# Patient Record
Sex: FEMALE | Race: OTHER | HISPANIC OR LATINO | URBAN - METROPOLITAN AREA
[De-identification: names, ages, dates, MRNs, and addresses within clinical notes are randomized per-mention and may not be internally consistent; named-entity substitution may affect disease eponyms.]

---

## 2017-04-26 ENCOUNTER — OUTPATIENT (OUTPATIENT)
Dept: OUTPATIENT SERVICES | Facility: HOSPITAL | Age: 77
LOS: 1 days | Discharge: HOME | End: 2017-04-26

## 2017-06-25 PROBLEM — Z00.00 ENCOUNTER FOR PREVENTIVE HEALTH EXAMINATION: Status: ACTIVE | Noted: 2017-06-25

## 2017-06-28 DIAGNOSIS — I20.9 ANGINA PECTORIS, UNSPECIFIED: ICD-10-CM

## 2017-07-24 ENCOUNTER — APPOINTMENT (OUTPATIENT)
Dept: OPHTHALMOLOGY | Facility: CLINIC | Age: 77
End: 2017-07-24

## 2018-01-22 ENCOUNTER — APPOINTMENT (OUTPATIENT)
Dept: OPHTHALMOLOGY | Facility: CLINIC | Age: 78
End: 2018-01-22
Payer: MEDICARE

## 2018-01-22 PROCEDURE — 92014 COMPRE OPH EXAM EST PT 1/>: CPT

## 2018-03-23 ENCOUNTER — OUTPATIENT (OUTPATIENT)
Dept: OUTPATIENT SERVICES | Facility: HOSPITAL | Age: 78
LOS: 1 days | Discharge: HOME | End: 2018-03-23

## 2018-03-23 VITALS — WEIGHT: 109.79 LBS | HEIGHT: 60 IN

## 2018-03-23 DIAGNOSIS — Z01.818 ENCOUNTER FOR OTHER PREPROCEDURAL EXAMINATION: ICD-10-CM

## 2018-03-23 DIAGNOSIS — Z98.890 OTHER SPECIFIED POSTPROCEDURAL STATES: Chronic | ICD-10-CM

## 2018-03-23 DIAGNOSIS — D68.8 OTHER SPECIFIED COAGULATION DEFECTS: ICD-10-CM

## 2018-03-23 DIAGNOSIS — K80.20 CALCULUS OF GALLBLADDER WITHOUT CHOLECYSTITIS WITHOUT OBSTRUCTION: ICD-10-CM

## 2018-03-23 LAB
ALBUMIN SERPL ELPH-MCNC: 4.4 G/DL — SIGNIFICANT CHANGE UP (ref 3.5–5.2)
ALP SERPL-CCNC: 88 U/L — SIGNIFICANT CHANGE UP (ref 30–115)
ALT FLD-CCNC: 16 U/L — SIGNIFICANT CHANGE UP (ref 0–41)
ANION GAP SERPL CALC-SCNC: 13 MMOL/L — SIGNIFICANT CHANGE UP (ref 7–14)
APTT BLD: 28.9 SEC — SIGNIFICANT CHANGE UP (ref 27–39.2)
AST SERPL-CCNC: 24 U/L — SIGNIFICANT CHANGE UP (ref 0–41)
BASOPHILS # BLD AUTO: 0.05 K/UL — SIGNIFICANT CHANGE UP (ref 0–0.2)
BASOPHILS NFR BLD AUTO: 1 % — SIGNIFICANT CHANGE UP (ref 0–1)
BILIRUB DIRECT SERPL-MCNC: <0.2 MG/DL — SIGNIFICANT CHANGE UP (ref 0–0.2)
BILIRUB INDIRECT FLD-MCNC: >0.3 MG/DL — SIGNIFICANT CHANGE UP (ref 0.2–1.2)
BILIRUB SERPL-MCNC: 0.5 MG/DL — SIGNIFICANT CHANGE UP (ref 0.2–1.2)
BUN SERPL-MCNC: 11 MG/DL — SIGNIFICANT CHANGE UP (ref 10–20)
CALCIUM SERPL-MCNC: 9.4 MG/DL — SIGNIFICANT CHANGE UP (ref 8.5–10.1)
CHLORIDE SERPL-SCNC: 101 MMOL/L — SIGNIFICANT CHANGE UP (ref 98–110)
CO2 SERPL-SCNC: 28 MMOL/L — SIGNIFICANT CHANGE UP (ref 17–32)
CREAT SERPL-MCNC: 0.7 MG/DL — SIGNIFICANT CHANGE UP (ref 0.7–1.5)
EOSINOPHIL # BLD AUTO: 0.33 K/UL — SIGNIFICANT CHANGE UP (ref 0–0.7)
EOSINOPHIL NFR BLD AUTO: 6.7 % — SIGNIFICANT CHANGE UP (ref 0–8)
GLUCOSE SERPL-MCNC: 89 MG/DL — SIGNIFICANT CHANGE UP (ref 70–110)
HCT VFR BLD CALC: 39.3 % — SIGNIFICANT CHANGE UP (ref 37–47)
HGB BLD-MCNC: 12.6 G/DL — SIGNIFICANT CHANGE UP (ref 12–16)
IMM GRANULOCYTES NFR BLD AUTO: 0.2 % — SIGNIFICANT CHANGE UP (ref 0.1–0.3)
INR BLD: 1.01 RATIO — SIGNIFICANT CHANGE UP (ref 0.65–1.3)
LYMPHOCYTES # BLD AUTO: 1.73 K/UL — SIGNIFICANT CHANGE UP (ref 1.2–3.4)
LYMPHOCYTES # BLD AUTO: 34.9 % — SIGNIFICANT CHANGE UP (ref 20.5–51.1)
MCHC RBC-ENTMCNC: 29.7 PG — SIGNIFICANT CHANGE UP (ref 27–31)
MCHC RBC-ENTMCNC: 32.1 G/DL — SIGNIFICANT CHANGE UP (ref 32–37)
MCV RBC AUTO: 92.7 FL — SIGNIFICANT CHANGE UP (ref 81–99)
MONOCYTES # BLD AUTO: 0.46 K/UL — SIGNIFICANT CHANGE UP (ref 0.1–0.6)
MONOCYTES NFR BLD AUTO: 9.3 % — SIGNIFICANT CHANGE UP (ref 1.7–9.3)
NEUTROPHILS # BLD AUTO: 2.37 K/UL — SIGNIFICANT CHANGE UP (ref 1.4–6.5)
NEUTROPHILS NFR BLD AUTO: 47.9 % — SIGNIFICANT CHANGE UP (ref 42.2–75.2)
NRBC # BLD: 0 /100 WBCS — SIGNIFICANT CHANGE UP (ref 0–0)
PLATELET # BLD AUTO: 176 K/UL — SIGNIFICANT CHANGE UP (ref 130–400)
POTASSIUM SERPL-MCNC: 4.8 MMOL/L — SIGNIFICANT CHANGE UP (ref 3.5–5)
POTASSIUM SERPL-SCNC: 4.8 MMOL/L — SIGNIFICANT CHANGE UP (ref 3.5–5)
PROT SERPL-MCNC: 7.1 G/DL — SIGNIFICANT CHANGE UP (ref 6–8)
PROTHROM AB SERPL-ACNC: 10.9 SEC — SIGNIFICANT CHANGE UP (ref 9.95–12.87)
RBC # BLD: 4.24 M/UL — SIGNIFICANT CHANGE UP (ref 4.2–5.4)
RBC # FLD: 13 % — SIGNIFICANT CHANGE UP (ref 11.5–14.5)
SODIUM SERPL-SCNC: 142 MMOL/L — SIGNIFICANT CHANGE UP (ref 135–146)
WBC # BLD: 4.95 K/UL — SIGNIFICANT CHANGE UP (ref 4.8–10.8)
WBC # FLD AUTO: 4.95 K/UL — SIGNIFICANT CHANGE UP (ref 4.8–10.8)

## 2018-03-23 NOTE — H&P PST ADULT - PMH
Anxiety    GERD (gastroesophageal reflux disease)    Hypercholesteremia    Hypothyroidism    OA (osteoarthritis)

## 2018-03-23 NOTE — H&P PST ADULT - FAMILY HISTORY
Father  Still living? No  CVA (cerebral vascular accident), Age at diagnosis: Age Unknown     Mother  Still living? Unknown  Family history of stomach cancer, Age at diagnosis: Age Unknown

## 2018-03-23 NOTE — H&P PST ADULT - REASON FOR ADMISSION
77 Y/O FEMALE HERE FOR PRE-ADMISSION SURGICAL TESTING. PATIENT REPORTS SHE WAS HAVING ABDOMINAL PAIN ON & OFF X1 YR. SONOGRAM REVEALED + GALLSTONES.  NOW FOR SCHEDULED LAPAROSCOPIC CHOLECYSTECTOMY, POSSIBLE OPEN.

## 2018-03-23 NOTE — H&P PST ADULT - HISTORY OF PRESENT ILLNESS
PATIENT DENIES CHEST PAIN, SHORTNESS OF BREATH, PALPITATIONS, COUGHING, FEVER, DYSURIA.  CAN WALK UP 2-3 FLIGHTS OF STEPS WITHOUT SOB.

## 2018-03-24 LAB
T4 AB SER-ACNC: 6.8 UG/DL — SIGNIFICANT CHANGE UP (ref 4.6–12)
TSH SERPL-MCNC: 5.82 UIU/ML — HIGH (ref 0.27–4.2)

## 2018-03-28 ENCOUNTER — OUTPATIENT (OUTPATIENT)
Dept: OUTPATIENT SERVICES | Facility: HOSPITAL | Age: 78
LOS: 1 days | Discharge: HOME | End: 2018-03-28

## 2018-03-28 DIAGNOSIS — Z98.890 OTHER SPECIFIED POSTPROCEDURAL STATES: Chronic | ICD-10-CM

## 2018-03-28 DIAGNOSIS — R31.9 HEMATURIA, UNSPECIFIED: ICD-10-CM

## 2018-04-06 ENCOUNTER — OUTPATIENT (OUTPATIENT)
Dept: OUTPATIENT SERVICES | Facility: HOSPITAL | Age: 78
LOS: 1 days | Discharge: HOME | End: 2018-04-06

## 2018-04-06 ENCOUNTER — RESULT REVIEW (OUTPATIENT)
Age: 78
End: 2018-04-06

## 2018-04-06 VITALS
RESPIRATION RATE: 13 BRPM | DIASTOLIC BLOOD PRESSURE: 85 MMHG | HEART RATE: 73 BPM | OXYGEN SATURATION: 96 % | SYSTOLIC BLOOD PRESSURE: 167 MMHG

## 2018-04-06 VITALS
DIASTOLIC BLOOD PRESSURE: 65 MMHG | SYSTOLIC BLOOD PRESSURE: 153 MMHG | TEMPERATURE: 98 F | HEIGHT: 60 IN | OXYGEN SATURATION: 99 % | WEIGHT: 109.79 LBS | HEART RATE: 60 BPM | RESPIRATION RATE: 16 BRPM

## 2018-04-06 DIAGNOSIS — Z98.890 OTHER SPECIFIED POSTPROCEDURAL STATES: Chronic | ICD-10-CM

## 2018-04-06 RX ORDER — METOPROLOL TARTRATE 50 MG
1 TABLET ORAL
Qty: 0 | Refills: 0 | COMMUNITY

## 2018-04-06 RX ORDER — SODIUM CHLORIDE 9 MG/ML
1000 INJECTION, SOLUTION INTRAVENOUS
Qty: 0 | Refills: 0 | Status: DISCONTINUED | OUTPATIENT
Start: 2018-04-06 | End: 2018-04-21

## 2018-04-06 RX ORDER — ROSUVASTATIN CALCIUM 5 MG/1
1 TABLET ORAL
Qty: 0 | Refills: 0 | COMMUNITY

## 2018-04-06 RX ORDER — MORPHINE SULFATE 50 MG/1
2 CAPSULE, EXTENDED RELEASE ORAL
Qty: 0 | Refills: 0 | Status: DISCONTINUED | OUTPATIENT
Start: 2018-04-06 | End: 2018-04-06

## 2018-04-06 RX ORDER — MORPHINE SULFATE 50 MG/1
1 CAPSULE, EXTENDED RELEASE ORAL
Qty: 0 | Refills: 0 | Status: DISCONTINUED | OUTPATIENT
Start: 2018-04-06 | End: 2018-04-06

## 2018-04-06 RX ORDER — ONDANSETRON 8 MG/1
4 TABLET, FILM COATED ORAL ONCE
Qty: 0 | Refills: 0 | Status: COMPLETED | OUTPATIENT
Start: 2018-04-06 | End: 2018-04-06

## 2018-04-06 RX ORDER — LEVOTHYROXINE SODIUM 125 MCG
1 TABLET ORAL
Qty: 0 | Refills: 0 | COMMUNITY

## 2018-04-06 RX ORDER — CHOLECALCIFEROL (VITAMIN D3) 125 MCG
1 CAPSULE ORAL
Qty: 0 | Refills: 0 | COMMUNITY

## 2018-04-06 RX ORDER — ASPIRIN/CALCIUM CARB/MAGNESIUM 324 MG
1 TABLET ORAL
Qty: 0 | Refills: 0 | COMMUNITY

## 2018-04-06 RX ORDER — ACETAMINOPHEN WITH CODEINE 300MG-30MG
1 TABLET ORAL
Qty: 20 | Refills: 0 | OUTPATIENT
Start: 2018-04-06 | End: 2018-04-10

## 2018-04-06 RX ORDER — OXYCODONE AND ACETAMINOPHEN 5; 325 MG/1; MG/1
1 TABLET ORAL EVERY 4 HOURS
Qty: 0 | Refills: 0 | Status: DISCONTINUED | OUTPATIENT
Start: 2018-04-06 | End: 2018-04-06

## 2018-04-06 RX ADMIN — MORPHINE SULFATE 2 MILLIGRAM(S): 50 CAPSULE, EXTENDED RELEASE ORAL at 12:50

## 2018-04-06 RX ADMIN — MORPHINE SULFATE 2 MILLIGRAM(S): 50 CAPSULE, EXTENDED RELEASE ORAL at 12:47

## 2018-04-06 RX ADMIN — MORPHINE SULFATE 2 MILLIGRAM(S): 50 CAPSULE, EXTENDED RELEASE ORAL at 13:12

## 2018-04-06 RX ADMIN — SODIUM CHLORIDE 60 MILLILITER(S): 9 INJECTION, SOLUTION INTRAVENOUS at 14:46

## 2018-04-06 RX ADMIN — ONDANSETRON 4 MILLIGRAM(S): 8 TABLET, FILM COATED ORAL at 13:59

## 2018-04-06 NOTE — BRIEF OPERATIVE NOTE - POST-OP DX
Calculus of gallbladder with chronic cholecystitis without obstruction  04/06/2018    Active  Aly Sharif

## 2018-04-06 NOTE — BRIEF OPERATIVE NOTE - PROCEDURE
<<-----Click on this checkbox to enter Procedure Laparoscopic cholecystectomy  04/06/2018    Active  LUISANA

## 2018-04-06 NOTE — BRIEF OPERATIVE NOTE - PRE-OP DX
Calculus of gallbladder with chronic cholecystitis without obstruction  04/06/2018    Active  Aly Sharif  Cholecystitis  04/06/2018    Active  Aly Sharif

## 2018-04-12 DIAGNOSIS — K80.10 CALCULUS OF GALLBLADDER WITH CHRONIC CHOLECYSTITIS WITHOUT OBSTRUCTION: ICD-10-CM

## 2018-04-12 DIAGNOSIS — Z91.040 LATEX ALLERGY STATUS: ICD-10-CM

## 2018-04-12 LAB — SURGICAL PATHOLOGY STUDY: SIGNIFICANT CHANGE UP

## 2018-07-23 ENCOUNTER — APPOINTMENT (OUTPATIENT)
Dept: OPHTHALMOLOGY | Facility: CLINIC | Age: 78
End: 2018-07-23
Payer: MEDICARE

## 2018-07-23 PROBLEM — K21.9 GASTRO-ESOPHAGEAL REFLUX DISEASE WITHOUT ESOPHAGITIS: Chronic | Status: ACTIVE | Noted: 2018-03-23

## 2018-07-23 PROBLEM — M19.90 UNSPECIFIED OSTEOARTHRITIS, UNSPECIFIED SITE: Chronic | Status: ACTIVE | Noted: 2018-03-23

## 2018-07-23 PROBLEM — E78.00 PURE HYPERCHOLESTEROLEMIA, UNSPECIFIED: Chronic | Status: ACTIVE | Noted: 2018-03-23

## 2018-07-23 PROBLEM — E03.9 HYPOTHYROIDISM, UNSPECIFIED: Chronic | Status: ACTIVE | Noted: 2018-03-23

## 2018-07-23 PROBLEM — F41.9 ANXIETY DISORDER, UNSPECIFIED: Chronic | Status: ACTIVE | Noted: 2018-03-23

## 2018-07-23 PROCEDURE — 92012 INTRM OPH EXAM EST PATIENT: CPT

## 2018-10-25 ENCOUNTER — OUTPATIENT (OUTPATIENT)
Dept: OUTPATIENT SERVICES | Facility: HOSPITAL | Age: 78
LOS: 1 days | Discharge: HOME | End: 2018-10-25

## 2018-10-25 DIAGNOSIS — R55 SYNCOPE AND COLLAPSE: ICD-10-CM

## 2018-10-25 DIAGNOSIS — Z98.890 OTHER SPECIFIED POSTPROCEDURAL STATES: Chronic | ICD-10-CM

## 2019-01-24 ENCOUNTER — APPOINTMENT (OUTPATIENT)
Dept: OPHTHALMOLOGY | Facility: CLINIC | Age: 79
End: 2019-01-24
Payer: MEDICARE

## 2019-01-24 DIAGNOSIS — H20.023 RECURRENT ACUTE IRIDOCYCLITIS, BILATERAL: ICD-10-CM

## 2019-01-24 DIAGNOSIS — H26.9 UNSPECIFIED CATARACT: ICD-10-CM

## 2019-01-24 DIAGNOSIS — H02.403 UNSPECIFIED PTOSIS OF BILATERAL EYELIDS: ICD-10-CM

## 2019-01-24 DIAGNOSIS — H35.9 UNSPECIFIED RETINAL DISORDER: ICD-10-CM

## 2019-01-24 PROCEDURE — 92012 INTRM OPH EXAM EST PATIENT: CPT

## 2019-04-30 ENCOUNTER — APPOINTMENT (OUTPATIENT)
Dept: CARDIOLOGY | Facility: CLINIC | Age: 79
End: 2019-04-30
Payer: MEDICARE

## 2019-04-30 PROCEDURE — 93000 ELECTROCARDIOGRAM COMPLETE: CPT

## 2019-04-30 PROCEDURE — 99214 OFFICE O/P EST MOD 30 MIN: CPT

## 2019-05-01 ENCOUNTER — APPOINTMENT (OUTPATIENT)
Dept: NEUROLOGY | Facility: CLINIC | Age: 79
End: 2019-05-01

## 2019-05-10 ENCOUNTER — APPOINTMENT (OUTPATIENT)
Dept: NEUROLOGY | Facility: CLINIC | Age: 79
End: 2019-05-10
Payer: MEDICARE

## 2019-05-10 PROCEDURE — 99213 OFFICE O/P EST LOW 20 MIN: CPT

## 2019-07-25 ENCOUNTER — NON-APPOINTMENT (OUTPATIENT)
Age: 79
End: 2019-07-25

## 2019-07-25 ENCOUNTER — APPOINTMENT (OUTPATIENT)
Dept: OPHTHALMOLOGY | Facility: CLINIC | Age: 79
End: 2019-07-25
Payer: MEDICARE

## 2019-07-25 PROCEDURE — 92014 COMPRE OPH EXAM EST PT 1/>: CPT

## 2019-07-25 PROCEDURE — 76519 ECHO EXAM OF EYE: CPT

## 2019-08-26 ENCOUNTER — APPOINTMENT (OUTPATIENT)
Dept: OTOLARYNGOLOGY | Facility: CLINIC | Age: 79
End: 2019-08-26
Payer: MEDICARE

## 2019-08-26 ENCOUNTER — APPOINTMENT (OUTPATIENT)
Dept: NEUROLOGY | Facility: CLINIC | Age: 79
End: 2019-08-26

## 2019-08-26 VITALS
SYSTOLIC BLOOD PRESSURE: 147 MMHG | DIASTOLIC BLOOD PRESSURE: 77 MMHG | BODY MASS INDEX: 21.6 KG/M2 | WEIGHT: 110 LBS | HEART RATE: 58 BPM | HEIGHT: 60 IN | TEMPERATURE: 97.7 F

## 2019-08-26 DIAGNOSIS — H92.09 OTALGIA, UNSPECIFIED EAR: ICD-10-CM

## 2019-08-26 DIAGNOSIS — E07.9 DISORDER OF THYROID, UNSPECIFIED: ICD-10-CM

## 2019-08-26 DIAGNOSIS — Z80.9 FAMILY HISTORY OF MALIGNANT NEOPLASM, UNSPECIFIED: ICD-10-CM

## 2019-08-26 DIAGNOSIS — Z80.6 FAMILY HISTORY OF LEUKEMIA: ICD-10-CM

## 2019-08-26 DIAGNOSIS — Z86.39 PERSONAL HISTORY OF OTHER ENDOCRINE, NUTRITIONAL AND METABOLIC DISEASE: ICD-10-CM

## 2019-08-26 DIAGNOSIS — Z78.9 OTHER SPECIFIED HEALTH STATUS: ICD-10-CM

## 2019-08-26 DIAGNOSIS — R51 HEADACHE: ICD-10-CM

## 2019-08-26 DIAGNOSIS — Z87.39 PERSONAL HISTORY OF OTHER DISEASES OF THE MUSCULOSKELETAL SYSTEM AND CONNECTIVE TISSUE: ICD-10-CM

## 2019-08-26 DIAGNOSIS — Z82.3 FAMILY HISTORY OF STROKE: ICD-10-CM

## 2019-08-26 PROCEDURE — 31575 DIAGNOSTIC LARYNGOSCOPY: CPT

## 2019-08-26 PROCEDURE — 99203 OFFICE O/P NEW LOW 30 MIN: CPT | Mod: 25

## 2019-08-27 NOTE — PROCEDURE
[Image(s) Captured] : image(s) captured and filed [Unable to Cooperate with Mirror] : patient unable to cooperate with mirror [Complicated Symptoms] : complicated symptoms requiring more thorough examination than provided by mirror [None] : none [Flexible Endoscope] : examined with the flexible endoscope [True Vocal Cords Erythematous] : no true vocal cord edema [True Vocal Cords Paralysis] : no true vocal cord paralysis [Normal] : posterior cricoid area had healthy pink mucosa in the interarytenoid area and the esophageal inlet [True Vocal Cords Witt's Nodules] : no true vocal cord nodules

## 2019-08-29 PROBLEM — Z78.9 CAFFEINE USE: Status: ACTIVE | Noted: 2019-08-26

## 2019-08-29 PROBLEM — Z78.9 DOES NOT USE TOBACCO: Status: ACTIVE | Noted: 2019-08-26

## 2019-08-29 PROBLEM — Z87.39 HISTORY OF ARTHRITIS: Status: RESOLVED | Noted: 2019-08-26 | Resolved: 2019-08-29

## 2019-08-29 PROBLEM — E07.9 THYROID TROUBLE: Status: RESOLVED | Noted: 2019-08-26 | Resolved: 2019-08-29

## 2019-08-29 PROBLEM — Z86.39 HISTORY OF HIGH CHOLESTEROL: Status: RESOLVED | Noted: 2019-08-26 | Resolved: 2019-08-29

## 2019-08-29 PROBLEM — Z80.6 FAMILY HISTORY OF LEUKEMIA: Status: ACTIVE | Noted: 2019-08-26

## 2019-08-29 PROBLEM — Z82.3 FAMILY HISTORY OF CEREBROVASCULAR ACCIDENT (CVA): Status: ACTIVE | Noted: 2019-08-26

## 2019-08-29 PROBLEM — H92.09 OTALGIA: Status: ACTIVE | Noted: 2019-08-29

## 2019-08-29 PROBLEM — Z80.9 FAMILY HISTORY OF MALIGNANT NEOPLASM: Status: ACTIVE | Noted: 2019-08-26

## 2019-08-29 PROBLEM — R51 OCCIPITAL HEADACHE: Status: ACTIVE | Noted: 2019-08-29

## 2019-08-29 NOTE — ASSESSMENT
[FreeTextEntry1] : 79F with otalgia & occipital headache. Normal physical examination. Patient is known to have intracranial aneurysms followed annually with MRI-Brain.\par \par - CT-Neck to r/o pharyngeal tumor\par - Audiometry\par - F/u after the above

## 2019-08-29 NOTE — HISTORY OF PRESENT ILLNESS
[de-identified] : 79F presenting today for evaluation for otalgia and bitemporal/occipital headache. This patient is known to have intracranial aneurysms. She reports bilateral on/off otalgia and bilateral decreased hearing but she denies otorrhea, tinnitus or vertigo. She has sore throat for long time but denies weight loss, dysphagia,aspiration, cough or hemoptysis. \par \par She has annual MRI-brain to follow up on the brain aneurysms that has been stable as per the patient.

## 2019-08-29 NOTE — REVIEW OF SYSTEMS
[Seasonal Allergies] : seasonal allergies [As Noted in HPI] : as noted in HPI [Eye Pain] : eye pain [Dry Eyes] : dry eyes [Joint Pain] : joint pain [Eyes Itch] : itching of the eyes [Anxiety] : anxiety [Negative] : Heme/Lymph

## 2019-08-29 NOTE — PHYSICAL EXAM
[Laryngoscopy Performed] : laryngoscopy was performed, see procedure section for findings [Midline] : trachea located in midline position [Normal] : cranial nerves 2-12 intact

## 2019-09-23 ENCOUNTER — APPOINTMENT (OUTPATIENT)
Dept: NEUROLOGY | Facility: CLINIC | Age: 79
End: 2019-09-23
Payer: MEDICARE

## 2019-09-23 VITALS
WEIGHT: 108 LBS | DIASTOLIC BLOOD PRESSURE: 76 MMHG | SYSTOLIC BLOOD PRESSURE: 126 MMHG | HEIGHT: 60 IN | BODY MASS INDEX: 21.2 KG/M2 | HEART RATE: 77 BPM

## 2019-09-23 PROCEDURE — 99215 OFFICE O/P EST HI 40 MIN: CPT

## 2019-09-23 NOTE — HISTORY OF PRESENT ILLNESS
[FreeTextEntry1] : Since her last visit, Ms. Harmon is doing well.  She reports occasional occipital headaches but does not need to take medication.  She c/o feeling off balance while walking and memory issues.  Has not had any falls or problems with weakness/numbness in the legs.  Imbalance according to patient noted by strangers trying to pass her walking in the park.  Otherwise no actual ataxia.  \par \par She is taking Aspirin 81 mg, Crestor,  Synthroid and Lorazepam.  Patient reports Lorazepam helps her anxiety and sleep.  She has been taking it for years and is concerned it may affect her health. \par \par She is compliant taking her medications Aspirin 81 mg, Crestor, Synthroid, Lorazepam.  The frequency of her headaches has decreased significantly since her last visit.  Patient reports not having to take medication for headaches.  Although her memory is a concern for her she does not have any memory or cognition deficits.

## 2019-09-23 NOTE — PHYSICAL EXAM
[FreeTextEntry1] : Neurologic Examination:\par NAD. AOx3.  Intact memory.  MOCA 30/30 Speech fluent, nondysarthric.  CN 2 – 12 normal.  \par Strength 5/5 b/l UE/LE.  NL tone, bulk. No abnl movements.  DTRs 2+ throughout.  Plantar response downgoing b/l.  (-) Hoffmans, clonus.  Sensory intact LT/PP, pain, temp, proprioception and vibration.  NL FTN/HKS.  No dysdiadokinesia.  Gait narrow based/NL tandem.  No rotation marching in place.  (-) Romberg.\par  MOCA is 30/30.

## 2019-09-23 NOTE — ASSESSMENT
[FreeTextEntry1] : This is a 79 year old right handed woman with a history of GERD, cholecystitis, anxiety, panic attacks, hypothyroidism, dyslipidemia with occasional occipital headaches currently stable.  Nonfocal exam.  No evidence of cognitive decline on MOCA testing.  \par \par Plan:\par 1. Advise patient to see Dr. Atkinson for evaluation and screening of aneurysm\par 2. Continue medications, recommend patient continue lorazepam for anxiety as per psychiatrist Dr. Haney.\par 3. Return in 12 months\par

## 2019-10-09 ENCOUNTER — APPOINTMENT (OUTPATIENT)
Dept: OPHTHALMOLOGY | Facility: AMBULATORY SURGERY CENTER | Age: 79
End: 2019-10-09
Payer: MEDICARE

## 2019-10-09 ENCOUNTER — OUTPATIENT (OUTPATIENT)
Dept: OUTPATIENT SERVICES | Facility: HOSPITAL | Age: 79
LOS: 1 days | Discharge: HOME | End: 2019-10-09

## 2019-10-09 DIAGNOSIS — Z98.890 OTHER SPECIFIED POSTPROCEDURAL STATES: Chronic | ICD-10-CM

## 2019-10-09 PROCEDURE — 66984 XCAPSL CTRC RMVL W/O ECP: CPT | Mod: RT

## 2019-10-10 ENCOUNTER — NON-APPOINTMENT (OUTPATIENT)
Age: 79
End: 2019-10-10

## 2019-10-10 ENCOUNTER — APPOINTMENT (OUTPATIENT)
Dept: OPHTHALMOLOGY | Facility: CLINIC | Age: 79
End: 2019-10-10
Payer: MEDICARE

## 2019-10-10 PROCEDURE — 99024 POSTOP FOLLOW-UP VISIT: CPT

## 2019-10-23 ENCOUNTER — APPOINTMENT (OUTPATIENT)
Dept: NEUROLOGY | Facility: CLINIC | Age: 79
End: 2019-10-23
Payer: MEDICARE

## 2019-10-23 VITALS
SYSTOLIC BLOOD PRESSURE: 124 MMHG | OXYGEN SATURATION: 100 % | HEART RATE: 67 BPM | TEMPERATURE: 98.6 F | DIASTOLIC BLOOD PRESSURE: 71 MMHG | HEIGHT: 60 IN | BODY MASS INDEX: 21.6 KG/M2 | WEIGHT: 110 LBS

## 2019-10-23 DIAGNOSIS — I67.1 CEREBRAL ANEURYSM, NONRUPTURED: ICD-10-CM

## 2019-10-23 PROCEDURE — 99215 OFFICE O/P EST HI 40 MIN: CPT

## 2019-10-23 RX ORDER — ASPIRIN ENTERIC COATED TABLETS 81 MG 81 MG/1
81 TABLET, DELAYED RELEASE ORAL DAILY
Refills: 0 | Status: ACTIVE | COMMUNITY

## 2019-10-24 ENCOUNTER — APPOINTMENT (OUTPATIENT)
Dept: OPHTHALMOLOGY | Facility: CLINIC | Age: 79
End: 2019-10-24
Payer: MEDICARE

## 2019-10-24 ENCOUNTER — NON-APPOINTMENT (OUTPATIENT)
Age: 79
End: 2019-10-24

## 2019-10-24 PROCEDURE — 99024 POSTOP FOLLOW-UP VISIT: CPT

## 2019-10-24 NOTE — PHYSICAL EXAM
[FreeTextEntry1] : NIH STROKE SCALE\par \par Item	                                                        Score\par 1 a.	Level of Consciousness	            0\par 1 b.          LOC Questions	                            0\par 1 c.	LOC Commands	                            0\par 2.	Best Gaze	                            0\par 3.	Visual	                                            0\par 4.	Facial Palsy	                            0\par 5 a.	Motor Arm - Left	                            0\par 5 b.	Motor Arm - Right	                            0\par 6 a.	Motor Leg - Left	                            0\par 6 b.	Motor Leg - Right	                            0\par 7.	Limb Ataxia	                            0\par 8.	Sensory	                                            0\par 9.	Language	                            0\par 10.	Dysarthria	                            0\par 11.	Extinction and Inattention  	            0\par ___________________________________________\par TOTAL	                                                            0\par \par mRS: 0 No symptoms at all\par \par Neurologic Exam:\par \par Mental status: Awake, alert and oriented x 4. Recent and remote memory intact. Naming, repetition and comprehension intact. Attention/concentration intact. No dysarthria, no aphasia.  Fund of knowledge appropriate.  \par Cranial nerves: Pupils equally round and reactive to light 3 mm to 2 mm, visual fields full, no nystagmus, EOMI, face symmetric, hearing intact bilaterally, palate elevation symmetric, tongue was midline, sternocleidomastoid/ shoulder shrug strength bilaterally 5/5.  \par Motor:  Normal bulk and tone, strength 5/5 in bilateral upper and lower extremities.   strength 5/5.  Rapid alternating movements intact and symmetric. \par Sensation: Intact to light touch.  No neglect. \par Coordination: No dysmetria on finger-to-nose and heel-to-shin.  No clumsiness.\par Reflexes: 2+ in upper and lower extremities, downgoing toes bilaterally\par Gait: Steady\par \par Cardio: +S1S2 No M/R/G, No JVD\par Pulm: CTA B/L no W/R/R\par Skin: Warm, Dry, Capillary refill <2 seconds, good skin turgor\par Abd: Soft, NTND\par Ext: no c/c/e\par \par \par

## 2019-10-24 NOTE — HISTORY OF PRESENT ILLNESS
[FreeTextEntry1] : Mrs. Harmon is a 79 years old right handed woman with a past medical history of hypercholesterolemia who presents to  clinic today as an initial evaluation referred by Dr. Green for an incidental finding of b/l ICA supraclinoid aneurysms as noted on her Brain MRA 8/20/2018 performed at [facility]. Per report there is a 4 mm right and 3 mm left supraclinoid ICA aneurysm with no interval change noted when compared with prior study of 9/23/2016. As per patient she has known about this aneurysm since "2008, when I had an episode of dizziness while at the gym and was off balance but no injury or fall." She reports she saves all her radiology reports, and admits that she has been informed of no interval changes of her intracranial aneurysm. She is asymptomatic, denies headaches on today's visit, no visual changes. \par Denies aneurysmal risk factors. Denies family history of intracranial aneurysm. No history of HTN, BP well controlled as per patient. Denies chronic infectious/inflammatory disease, connective tissue disorder, collagen disease, PCKD.

## 2019-10-24 NOTE — ASSESSMENT
[FreeTextEntry1] : A 79 years old woman presents to  clinic today as initial evaluation referred by Dr. Green for an incidental finding of b/l ICA supraclinoid aneurysms as noted on her Brain MRA 8/20/2018 performed at [facility] with results reporting of stable and no interval changes since prior imaging on 9/23/2016. Per the patient statement she had another MRA performed around 2008/2009 which showed the same size of aneurysm. No neurological symptoms reported today. We offered to perform diagnostic cerebral angiogram to better visualize and characterize the intracranial aneurysms. Risks and benefits were discussed with patient. However, patient wishes to discuss with her PCP and further think about it. \par \par PLAN:\par - Recommend diagnostic cerebral angiogram. If she refuses that provided alternative with repeat MRA brain imaging in 1-2 years. \par - Strict adherence to BP goal < 140/80\par - Return to clinic in 1-2 years after repeat MRA brain if refuse diagnostic cerebral angiogram\par - Continue management per Neurology\par - Medical management per PMD\par - Instructed patient to call 911 or report to ED if any acute neurological changes occur\par

## 2019-10-24 NOTE — END OF VISIT
[FreeTextEntry3] : Patient seen and examined by me with the above mentioned NP and agree with above.\par Reviewed imaging and records personally.\par Patient NIHSS 0.\par Plan as above.\par \par  [Time Spent: ___ minutes] : I have spent [unfilled] minutes of face to face time with the patient

## 2019-11-14 ENCOUNTER — APPOINTMENT (OUTPATIENT)
Dept: OPHTHALMOLOGY | Facility: CLINIC | Age: 79
End: 2019-11-14
Payer: MEDICARE

## 2019-11-14 ENCOUNTER — NON-APPOINTMENT (OUTPATIENT)
Age: 79
End: 2019-11-14

## 2019-11-14 PROCEDURE — 99024 POSTOP FOLLOW-UP VISIT: CPT

## 2019-11-27 ENCOUNTER — APPOINTMENT (OUTPATIENT)
Dept: NEUROLOGY | Facility: CLINIC | Age: 79
End: 2019-11-27

## 2019-12-17 ENCOUNTER — APPOINTMENT (OUTPATIENT)
Dept: CARDIOLOGY | Facility: CLINIC | Age: 79
End: 2019-12-17
Payer: MEDICARE

## 2019-12-17 PROCEDURE — 93880 EXTRACRANIAL BILAT STUDY: CPT

## 2019-12-18 ENCOUNTER — APPOINTMENT (OUTPATIENT)
Dept: CARDIOLOGY | Facility: CLINIC | Age: 79
End: 2019-12-18
Payer: MEDICARE

## 2019-12-18 PROCEDURE — 93306 TTE W/DOPPLER COMPLETE: CPT

## 2019-12-23 ENCOUNTER — APPOINTMENT (OUTPATIENT)
Dept: CARDIOLOGY | Facility: CLINIC | Age: 79
End: 2019-12-23
Payer: MEDICARE

## 2019-12-23 PROCEDURE — 93000 ELECTROCARDIOGRAM COMPLETE: CPT

## 2019-12-23 PROCEDURE — 99214 OFFICE O/P EST MOD 30 MIN: CPT

## 2020-01-01 ENCOUNTER — APPOINTMENT (OUTPATIENT)
Dept: OPHTHALMOLOGY | Facility: CLINIC | Age: 80
End: 2020-01-01
Payer: MEDICARE

## 2020-01-01 ENCOUNTER — RX RENEWAL (OUTPATIENT)
Age: 80
End: 2020-01-01

## 2020-01-01 ENCOUNTER — APPOINTMENT (OUTPATIENT)
Dept: NEUROLOGY | Facility: CLINIC | Age: 80
End: 2020-01-01

## 2020-01-01 ENCOUNTER — NON-APPOINTMENT (OUTPATIENT)
Age: 80
End: 2020-01-01

## 2020-01-01 ENCOUNTER — RECORD ABSTRACTING (OUTPATIENT)
Age: 80
End: 2020-01-01

## 2020-01-01 ENCOUNTER — APPOINTMENT (OUTPATIENT)
Dept: CARDIOLOGY | Facility: CLINIC | Age: 80
End: 2020-01-01
Payer: MEDICARE

## 2020-01-01 ENCOUNTER — APPOINTMENT (OUTPATIENT)
Dept: OPHTHALMOLOGY | Facility: CLINIC | Age: 80
End: 2020-01-01

## 2020-01-01 VITALS
BODY MASS INDEX: 21.4 KG/M2 | SYSTOLIC BLOOD PRESSURE: 122 MMHG | DIASTOLIC BLOOD PRESSURE: 67 MMHG | HEIGHT: 60 IN | WEIGHT: 109 LBS | HEART RATE: 73 BPM

## 2020-01-01 DIAGNOSIS — Z86.79 PERSONAL HISTORY OF OTHER DISEASES OF THE CIRCULATORY SYSTEM: ICD-10-CM

## 2020-01-01 DIAGNOSIS — I36.0 NONRHEUMATIC TRICUSPID (VALVE) STENOSIS: ICD-10-CM

## 2020-01-01 DIAGNOSIS — I35.1 NONRHEUMATIC AORTIC (VALVE) INSUFFICIENCY: ICD-10-CM

## 2020-01-01 DIAGNOSIS — E78.5 HYPERLIPIDEMIA, UNSPECIFIED: ICD-10-CM

## 2020-01-01 DIAGNOSIS — R07.89 OTHER CHEST PAIN: ICD-10-CM

## 2020-01-01 DIAGNOSIS — I34.0 NONRHEUMATIC MITRAL (VALVE) INSUFFICIENCY: ICD-10-CM

## 2020-01-01 DIAGNOSIS — Z87.898 PERSONAL HISTORY OF OTHER SPECIFIED CONDITIONS: ICD-10-CM

## 2020-01-01 DIAGNOSIS — I65.23 OCCLUSION AND STENOSIS OF BILATERAL CAROTID ARTERIES: ICD-10-CM

## 2020-01-01 DIAGNOSIS — R55 SYNCOPE AND COLLAPSE: ICD-10-CM

## 2020-01-01 DIAGNOSIS — G47.33 OBSTRUCTIVE SLEEP APNEA (ADULT) (PEDIATRIC): ICD-10-CM

## 2020-01-01 PROCEDURE — 99214 OFFICE O/P EST MOD 30 MIN: CPT

## 2020-01-01 PROCEDURE — 99442: CPT | Mod: 95

## 2020-01-01 PROCEDURE — 93000 ELECTROCARDIOGRAM COMPLETE: CPT | Mod: NC

## 2020-01-01 RX ORDER — ROSUVASTATIN CALCIUM 10 MG/1
10 TABLET, FILM COATED ORAL DAILY
Refills: 0 | Status: COMPLETED | COMMUNITY
End: 2020-01-01

## 2020-01-01 RX ORDER — LORAZEPAM 0.5 MG/1
0.5 TABLET ORAL DAILY
Refills: 0 | Status: ACTIVE | COMMUNITY

## 2020-01-01 RX ORDER — LEVOTHYROXINE SODIUM 25 UG/1
25 TABLET ORAL DAILY
Qty: 30 | Refills: 0 | Status: ACTIVE | COMMUNITY

## 2020-01-01 RX ORDER — PRAVASTATIN SODIUM 20 MG/1
20 TABLET ORAL DAILY
Refills: 0 | Status: COMPLETED | COMMUNITY
End: 2020-01-01

## 2020-01-01 RX ORDER — ROSUVASTATIN CALCIUM 10 MG/1
10 TABLET, FILM COATED ORAL DAILY
Qty: 90 | Refills: 3 | Status: ACTIVE | COMMUNITY
Start: 2020-01-01 | End: 1900-01-01

## 2020-01-03 ENCOUNTER — APPOINTMENT (OUTPATIENT)
Dept: CARDIOLOGY | Facility: CLINIC | Age: 80
End: 2020-01-03
Payer: MEDICARE

## 2020-01-03 PROCEDURE — 0296T: CPT

## 2020-01-08 ENCOUNTER — OUTPATIENT (OUTPATIENT)
Dept: OUTPATIENT SERVICES | Facility: HOSPITAL | Age: 80
LOS: 1 days | Discharge: HOME | End: 2020-01-08
Payer: MEDICARE

## 2020-01-08 DIAGNOSIS — Z98.890 OTHER SPECIFIED POSTPROCEDURAL STATES: Chronic | ICD-10-CM

## 2020-01-08 DIAGNOSIS — R07.9 CHEST PAIN, UNSPECIFIED: ICD-10-CM

## 2020-01-08 PROCEDURE — 78452 HT MUSCLE IMAGE SPECT MULT: CPT | Mod: 26

## 2020-02-13 ENCOUNTER — APPOINTMENT (OUTPATIENT)
Dept: OPHTHALMOLOGY | Facility: CLINIC | Age: 80
End: 2020-02-13
Payer: MEDICARE

## 2020-02-13 ENCOUNTER — NON-APPOINTMENT (OUTPATIENT)
Age: 80
End: 2020-02-13

## 2020-02-13 PROCEDURE — 92012 INTRM OPH EXAM EST PATIENT: CPT

## 2020-08-27 PROBLEM — E78.5 HYPERLIPIDEMIA, UNSPECIFIED HYPERLIPIDEMIA TYPE: Status: ACTIVE | Noted: 2020-01-01

## 2020-08-27 PROBLEM — G47.33 OBSTRUCTIVE SLEEP APNEA OF ADULT: Status: RESOLVED | Noted: 2020-01-01 | Resolved: 2020-01-01

## 2020-08-27 PROBLEM — R55 SYNCOPE AND COLLAPSE: Status: ACTIVE | Noted: 2020-01-01

## 2020-08-27 PROBLEM — Z87.898 HISTORY OF PALPITATIONS: Status: RESOLVED | Noted: 2020-01-01 | Resolved: 2020-01-01

## 2020-08-27 PROBLEM — I35.1 NONRHEUMATIC AORTIC VALVE INSUFFICIENCY: Status: ACTIVE | Noted: 2020-01-01

## 2020-08-27 PROBLEM — I65.23 STENOSIS OF BOTH EXTERNAL CAROTID ARTERIES: Status: ACTIVE | Noted: 2020-01-01

## 2020-08-27 PROBLEM — Z86.79 HISTORY OF MITRAL VALVE DISEASE: Status: RESOLVED | Noted: 2020-01-01 | Resolved: 2020-01-01

## 2020-08-27 PROBLEM — I36.0 NONRHEUMATIC TRICUSPID VALVE STENOSIS: Status: ACTIVE | Noted: 2020-01-01

## 2020-08-27 PROBLEM — R07.89 ATYPICAL CHEST PAIN: Status: RESOLVED | Noted: 2020-01-01 | Resolved: 2020-01-01

## 2020-08-27 PROBLEM — I34.0 NONRHEUMATIC MITRAL (VALVE) INSUFFICIENCY: Status: ACTIVE | Noted: 2020-01-01

## 2020-09-15 PROBLEM — E78.5 HYPERLIPIDEMIA: Status: ACTIVE | Noted: 2020-01-01

## 2021-01-01 ENCOUNTER — INPATIENT (INPATIENT)
Facility: HOSPITAL | Age: 81
LOS: 2 days | End: 2021-02-27
Attending: STUDENT IN AN ORGANIZED HEALTH CARE EDUCATION/TRAINING PROGRAM | Admitting: STUDENT IN AN ORGANIZED HEALTH CARE EDUCATION/TRAINING PROGRAM
Payer: MEDICARE

## 2021-01-01 VITALS
WEIGHT: 110.01 LBS | RESPIRATION RATE: 19 BRPM | SYSTOLIC BLOOD PRESSURE: 166 MMHG | HEART RATE: 83 BPM | HEIGHT: 60 IN | DIASTOLIC BLOOD PRESSURE: 85 MMHG | TEMPERATURE: 98 F | OXYGEN SATURATION: 99 %

## 2021-01-01 VITALS — RESPIRATION RATE: 16 BRPM | HEART RATE: 50 BPM | OXYGEN SATURATION: 100 %

## 2021-01-01 DIAGNOSIS — Z98.890 OTHER SPECIFIED POSTPROCEDURAL STATES: Chronic | ICD-10-CM

## 2021-01-01 LAB
ABO RH CONFIRMATION: SIGNIFICANT CHANGE UP
ALBUMIN SERPL ELPH-MCNC: 4.4 G/DL — SIGNIFICANT CHANGE UP (ref 3.5–5.2)
ALP SERPL-CCNC: 100 U/L — SIGNIFICANT CHANGE UP (ref 30–115)
ALT FLD-CCNC: 20 U/L — SIGNIFICANT CHANGE UP (ref 0–41)
ANION GAP SERPL CALC-SCNC: 10 MMOL/L — SIGNIFICANT CHANGE UP (ref 7–14)
ANION GAP SERPL CALC-SCNC: 13 MMOL/L — SIGNIFICANT CHANGE UP (ref 7–14)
ANION GAP SERPL CALC-SCNC: 14 MMOL/L — SIGNIFICANT CHANGE UP (ref 7–14)
ANION GAP SERPL CALC-SCNC: 14 MMOL/L — SIGNIFICANT CHANGE UP (ref 7–14)
ANION GAP SERPL CALC-SCNC: 15 MMOL/L — HIGH (ref 7–14)
ANION GAP SERPL CALC-SCNC: 19 MMOL/L — HIGH (ref 7–14)
APPEARANCE UR: CLEAR — SIGNIFICANT CHANGE UP
APTT BLD: 27.9 SEC — SIGNIFICANT CHANGE UP (ref 27–39.2)
APTT BLD: 28 SEC — SIGNIFICANT CHANGE UP (ref 27–39.2)
APTT BLD: 32 SEC — SIGNIFICANT CHANGE UP (ref 27–39.2)
AST SERPL-CCNC: 37 U/L — SIGNIFICANT CHANGE UP (ref 0–41)
BASE EXCESS BLDA CALC-SCNC: -0.9 MMOL/L — SIGNIFICANT CHANGE UP (ref -2–2)
BASOPHILS # BLD AUTO: 0.08 K/UL — SIGNIFICANT CHANGE UP (ref 0–0.2)
BASOPHILS NFR BLD AUTO: 0.9 % — SIGNIFICANT CHANGE UP (ref 0–1)
BILIRUB SERPL-MCNC: 0.5 MG/DL — SIGNIFICANT CHANGE UP (ref 0.2–1.2)
BILIRUB UR-MCNC: NEGATIVE — SIGNIFICANT CHANGE UP
BLD GP AB SCN SERPL QL: SIGNIFICANT CHANGE UP
BUN SERPL-MCNC: 12 MG/DL — SIGNIFICANT CHANGE UP (ref 10–20)
BUN SERPL-MCNC: 13 MG/DL — SIGNIFICANT CHANGE UP (ref 10–20)
BUN SERPL-MCNC: 17 MG/DL — SIGNIFICANT CHANGE UP (ref 10–20)
BUN SERPL-MCNC: 20 MG/DL — SIGNIFICANT CHANGE UP (ref 10–20)
CALCIUM SERPL-MCNC: 6.1 MG/DL — LOW (ref 8.5–10.1)
CALCIUM SERPL-MCNC: 8.5 MG/DL — SIGNIFICANT CHANGE UP (ref 8.5–10.1)
CALCIUM SERPL-MCNC: 8.5 MG/DL — SIGNIFICANT CHANGE UP (ref 8.5–10.1)
CALCIUM SERPL-MCNC: 9.2 MG/DL — SIGNIFICANT CHANGE UP (ref 8.5–10.1)
CALCIUM SERPL-MCNC: 9.2 MG/DL — SIGNIFICANT CHANGE UP (ref 8.5–10.1)
CALCIUM SERPL-MCNC: 9.3 MG/DL — SIGNIFICANT CHANGE UP (ref 8.5–10.1)
CHLORIDE SERPL-SCNC: 101 MMOL/L — SIGNIFICANT CHANGE UP (ref 98–110)
CHLORIDE SERPL-SCNC: 104 MMOL/L — SIGNIFICANT CHANGE UP (ref 98–110)
CHLORIDE SERPL-SCNC: 105 MMOL/L — SIGNIFICANT CHANGE UP (ref 98–110)
CHLORIDE SERPL-SCNC: 109 MMOL/L — SIGNIFICANT CHANGE UP (ref 98–110)
CHLORIDE SERPL-SCNC: 109 MMOL/L — SIGNIFICANT CHANGE UP (ref 98–110)
CHLORIDE SERPL-SCNC: 118 MMOL/L — HIGH (ref 98–110)
CO2 SERPL-SCNC: 14 MMOL/L — LOW (ref 17–32)
CO2 SERPL-SCNC: 16 MMOL/L — LOW (ref 17–32)
CO2 SERPL-SCNC: 17 MMOL/L — SIGNIFICANT CHANGE UP (ref 17–32)
CO2 SERPL-SCNC: 17 MMOL/L — SIGNIFICANT CHANGE UP (ref 17–32)
CO2 SERPL-SCNC: 19 MMOL/L — SIGNIFICANT CHANGE UP (ref 17–32)
CO2 SERPL-SCNC: 23 MMOL/L — SIGNIFICANT CHANGE UP (ref 17–32)
COLOR SPEC: COLORLESS — SIGNIFICANT CHANGE UP
CREAT SERPL-MCNC: 0.7 MG/DL — SIGNIFICANT CHANGE UP (ref 0.7–1.5)
CREAT SERPL-MCNC: 0.8 MG/DL — SIGNIFICANT CHANGE UP (ref 0.7–1.5)
CREAT SERPL-MCNC: 0.8 MG/DL — SIGNIFICANT CHANGE UP (ref 0.7–1.5)
CREAT SERPL-MCNC: <0.5 MG/DL — LOW (ref 0.7–1.5)
DIFF PNL FLD: SIGNIFICANT CHANGE UP
EOSINOPHIL # BLD AUTO: 0.15 K/UL — SIGNIFICANT CHANGE UP (ref 0–0.7)
EOSINOPHIL NFR BLD AUTO: 1.7 % — SIGNIFICANT CHANGE UP (ref 0–8)
ETHANOL SERPL-MCNC: <10 MG/DL — SIGNIFICANT CHANGE UP
GAS PNL BLDA: SIGNIFICANT CHANGE UP
GAS PNL BLDA: SIGNIFICANT CHANGE UP
GIANT PLATELETS BLD QL SMEAR: PRESENT — SIGNIFICANT CHANGE UP
GLUCOSE BLDC GLUCOMTR-MCNC: 113 MG/DL — HIGH (ref 70–99)
GLUCOSE BLDC GLUCOMTR-MCNC: 119 MG/DL — HIGH (ref 70–99)
GLUCOSE BLDC GLUCOMTR-MCNC: 123 MG/DL — HIGH (ref 70–99)
GLUCOSE BLDC GLUCOMTR-MCNC: 124 MG/DL — HIGH (ref 70–99)
GLUCOSE BLDC GLUCOMTR-MCNC: 126 MG/DL — HIGH (ref 70–99)
GLUCOSE BLDC GLUCOMTR-MCNC: 142 MG/DL — HIGH (ref 70–99)
GLUCOSE BLDC GLUCOMTR-MCNC: 152 MG/DL — HIGH (ref 70–99)
GLUCOSE BLDC GLUCOMTR-MCNC: 157 MG/DL — HIGH (ref 70–99)
GLUCOSE BLDC GLUCOMTR-MCNC: 189 MG/DL — HIGH (ref 70–99)
GLUCOSE SERPL-MCNC: 127 MG/DL — HIGH (ref 70–99)
GLUCOSE SERPL-MCNC: 131 MG/DL — HIGH (ref 70–99)
GLUCOSE SERPL-MCNC: 150 MG/DL — HIGH (ref 70–99)
GLUCOSE SERPL-MCNC: 152 MG/DL — HIGH (ref 70–99)
GLUCOSE SERPL-MCNC: 296 MG/DL — HIGH (ref 70–99)
GLUCOSE SERPL-MCNC: 96 MG/DL — SIGNIFICANT CHANGE UP (ref 70–99)
GLUCOSE UR QL: ABNORMAL
HCO3 BLDA-SCNC: 23 MMOL/L — SIGNIFICANT CHANGE UP (ref 23–27)
HCT VFR BLD CALC: 30.8 % — LOW (ref 37–47)
HCT VFR BLD CALC: 35 % — LOW (ref 37–47)
HCT VFR BLD CALC: 35.9 % — LOW (ref 37–47)
HCT VFR BLD CALC: 36.9 % — LOW (ref 37–47)
HCT VFR BLD CALC: 38.1 % — SIGNIFICANT CHANGE UP (ref 37–47)
HGB BLD-MCNC: 10 G/DL — LOW (ref 12–16)
HGB BLD-MCNC: 11.1 G/DL — LOW (ref 12–16)
HGB BLD-MCNC: 11.9 G/DL — LOW (ref 12–16)
HGB BLD-MCNC: 12.1 G/DL — SIGNIFICANT CHANGE UP (ref 12–16)
HGB BLD-MCNC: 12.6 G/DL — SIGNIFICANT CHANGE UP (ref 12–16)
HOROWITZ INDEX BLDA+IHG-RTO: 100 — SIGNIFICANT CHANGE UP
INR BLD: 1.09 RATIO — SIGNIFICANT CHANGE UP (ref 0.65–1.3)
INR BLD: 1.18 RATIO — SIGNIFICANT CHANGE UP (ref 0.65–1.3)
INR BLD: 1.39 RATIO — HIGH (ref 0.65–1.3)
KETONES UR-MCNC: ABNORMAL
LACTATE SERPL-SCNC: 2.6 MMOL/L — HIGH (ref 0.7–2)
LEUKOCYTE ESTERASE UR-ACNC: NEGATIVE — SIGNIFICANT CHANGE UP
LIDOCAIN IGE QN: 35 U/L — SIGNIFICANT CHANGE UP (ref 7–60)
LYMPHOCYTES # BLD AUTO: 4.51 K/UL — HIGH (ref 1.2–3.4)
LYMPHOCYTES # BLD AUTO: 51.8 % — HIGH (ref 20.5–51.1)
MAGNESIUM SERPL-MCNC: 1.4 MG/DL — LOW (ref 1.8–2.4)
MAGNESIUM SERPL-MCNC: 1.8 MG/DL — SIGNIFICANT CHANGE UP (ref 1.8–2.4)
MAGNESIUM SERPL-MCNC: 1.8 MG/DL — SIGNIFICANT CHANGE UP (ref 1.8–2.4)
MAGNESIUM SERPL-MCNC: 2.4 MG/DL — SIGNIFICANT CHANGE UP (ref 1.8–2.4)
MANUAL SMEAR VERIFICATION: SIGNIFICANT CHANGE UP
MCHC RBC-ENTMCNC: 30.1 PG — SIGNIFICANT CHANGE UP (ref 27–31)
MCHC RBC-ENTMCNC: 30.2 PG — SIGNIFICANT CHANGE UP (ref 27–31)
MCHC RBC-ENTMCNC: 30.3 PG — SIGNIFICANT CHANGE UP (ref 27–31)
MCHC RBC-ENTMCNC: 30.5 PG — SIGNIFICANT CHANGE UP (ref 27–31)
MCHC RBC-ENTMCNC: 30.8 PG — SIGNIFICANT CHANGE UP (ref 27–31)
MCHC RBC-ENTMCNC: 31.7 G/DL — LOW (ref 32–37)
MCHC RBC-ENTMCNC: 32.2 G/DL — SIGNIFICANT CHANGE UP (ref 32–37)
MCHC RBC-ENTMCNC: 32.5 G/DL — SIGNIFICANT CHANGE UP (ref 32–37)
MCHC RBC-ENTMCNC: 33.1 G/DL — SIGNIFICANT CHANGE UP (ref 32–37)
MCHC RBC-ENTMCNC: 33.7 G/DL — SIGNIFICANT CHANGE UP (ref 32–37)
MCV RBC AUTO: 91.3 FL — SIGNIFICANT CHANGE UP (ref 81–99)
MCV RBC AUTO: 92.3 FL — SIGNIFICANT CHANGE UP (ref 81–99)
MCV RBC AUTO: 92.8 FL — SIGNIFICANT CHANGE UP (ref 81–99)
MCV RBC AUTO: 93.7 FL — SIGNIFICANT CHANGE UP (ref 81–99)
MCV RBC AUTO: 95.6 FL — SIGNIFICANT CHANGE UP (ref 81–99)
MONOCYTES # BLD AUTO: 0.3 K/UL — SIGNIFICANT CHANGE UP (ref 0.1–0.6)
MONOCYTES NFR BLD AUTO: 3.5 % — SIGNIFICANT CHANGE UP (ref 1.7–9.3)
NEUTROPHILS # BLD AUTO: 2.67 K/UL — SIGNIFICANT CHANGE UP (ref 1.4–6.5)
NEUTROPHILS NFR BLD AUTO: 30.7 % — LOW (ref 42.2–75.2)
NITRITE UR-MCNC: NEGATIVE — SIGNIFICANT CHANGE UP
NRBC # BLD: 0 /100 WBCS — SIGNIFICANT CHANGE UP (ref 0–0)
PCO2 BLDA: 33 MMHG — LOW (ref 38–42)
PH BLDA: 7.44 — HIGH (ref 7.38–7.42)
PH UR: 7.5 — SIGNIFICANT CHANGE UP (ref 5–8)
PHOSPHATE SERPL-MCNC: 2.3 MG/DL — SIGNIFICANT CHANGE UP (ref 2.1–4.9)
PHOSPHATE SERPL-MCNC: 2.9 MG/DL — SIGNIFICANT CHANGE UP (ref 2.1–4.9)
PHOSPHATE SERPL-MCNC: 3.3 MG/DL — SIGNIFICANT CHANGE UP (ref 2.1–4.9)
PHOSPHATE SERPL-MCNC: 3.5 MG/DL — SIGNIFICANT CHANGE UP (ref 2.1–4.9)
PLAT MORPH BLD: NORMAL — SIGNIFICANT CHANGE UP
PLATELET # BLD AUTO: 106 K/UL — LOW (ref 130–400)
PLATELET # BLD AUTO: 156 K/UL — SIGNIFICANT CHANGE UP (ref 130–400)
PLATELET # BLD AUTO: 171 K/UL — SIGNIFICANT CHANGE UP (ref 130–400)
PLATELET # BLD AUTO: 198 K/UL — SIGNIFICANT CHANGE UP (ref 130–400)
PLATELET # BLD AUTO: 98 K/UL — LOW (ref 130–400)
PO2 BLDA: 471 MMHG — HIGH (ref 78–95)
POIKILOCYTOSIS BLD QL AUTO: SLIGHT — SIGNIFICANT CHANGE UP
POTASSIUM SERPL-MCNC: 3 MMOL/L — LOW (ref 3.5–5)
POTASSIUM SERPL-MCNC: 3.3 MMOL/L — LOW (ref 3.5–5)
POTASSIUM SERPL-MCNC: 4.1 MMOL/L — SIGNIFICANT CHANGE UP (ref 3.5–5)
POTASSIUM SERPL-MCNC: 4.4 MMOL/L — SIGNIFICANT CHANGE UP (ref 3.5–5)
POTASSIUM SERPL-MCNC: 4.4 MMOL/L — SIGNIFICANT CHANGE UP (ref 3.5–5)
POTASSIUM SERPL-MCNC: 4.9 MMOL/L — SIGNIFICANT CHANGE UP (ref 3.5–5)
POTASSIUM SERPL-SCNC: 3 MMOL/L — LOW (ref 3.5–5)
POTASSIUM SERPL-SCNC: 3.3 MMOL/L — LOW (ref 3.5–5)
POTASSIUM SERPL-SCNC: 4.1 MMOL/L — SIGNIFICANT CHANGE UP (ref 3.5–5)
POTASSIUM SERPL-SCNC: 4.4 MMOL/L — SIGNIFICANT CHANGE UP (ref 3.5–5)
POTASSIUM SERPL-SCNC: 4.4 MMOL/L — SIGNIFICANT CHANGE UP (ref 3.5–5)
POTASSIUM SERPL-SCNC: 4.9 MMOL/L — SIGNIFICANT CHANGE UP (ref 3.5–5)
PROT SERPL-MCNC: 7 G/DL — SIGNIFICANT CHANGE UP (ref 6–8)
PROT UR-MCNC: SIGNIFICANT CHANGE UP
PROTHROM AB SERPL-ACNC: 12.5 SEC — SIGNIFICANT CHANGE UP (ref 9.95–12.87)
PROTHROM AB SERPL-ACNC: 13.6 SEC — HIGH (ref 9.95–12.87)
PROTHROM AB SERPL-ACNC: 16 SEC — HIGH (ref 9.95–12.87)
RBC # BLD: 3.32 M/UL — LOW (ref 4.2–5.4)
RBC # BLD: 3.66 M/UL — LOW (ref 4.2–5.4)
RBC # BLD: 3.93 M/UL — LOW (ref 4.2–5.4)
RBC # BLD: 3.94 M/UL — LOW (ref 4.2–5.4)
RBC # BLD: 4.13 M/UL — LOW (ref 4.2–5.4)
RBC # FLD: 12.6 % — SIGNIFICANT CHANGE UP (ref 11.5–14.5)
RBC # FLD: 12.7 % — SIGNIFICANT CHANGE UP (ref 11.5–14.5)
RBC # FLD: 12.8 % — SIGNIFICANT CHANGE UP (ref 11.5–14.5)
RBC # FLD: 13.2 % — SIGNIFICANT CHANGE UP (ref 11.5–14.5)
RBC # FLD: 13.2 % — SIGNIFICANT CHANGE UP (ref 11.5–14.5)
RBC BLD AUTO: NORMAL — SIGNIFICANT CHANGE UP
SAO2 % BLDA: 100 % — HIGH (ref 94–98)
SARS-COV-2 IGG SERPL QL IA: NEGATIVE — SIGNIFICANT CHANGE UP
SARS-COV-2 IGM SERPL IA-ACNC: 1.13 INDEX — SIGNIFICANT CHANGE UP
SARS-COV-2 RNA SPEC QL NAA+PROBE: SIGNIFICANT CHANGE UP
SMUDGE CELLS # BLD: PRESENT — SIGNIFICANT CHANGE UP
SODIUM SERPL-SCNC: 136 MMOL/L — SIGNIFICANT CHANGE UP (ref 135–146)
SODIUM SERPL-SCNC: 138 MMOL/L — SIGNIFICANT CHANGE UP (ref 135–146)
SODIUM SERPL-SCNC: 139 MMOL/L — SIGNIFICANT CHANGE UP (ref 135–146)
SODIUM SERPL-SCNC: 140 MMOL/L — SIGNIFICANT CHANGE UP (ref 135–146)
SODIUM SERPL-SCNC: 142 MMOL/L — SIGNIFICANT CHANGE UP (ref 135–146)
SODIUM SERPL-SCNC: 142 MMOL/L — SIGNIFICANT CHANGE UP (ref 135–146)
SP GR SPEC: 1.02 — SIGNIFICANT CHANGE UP (ref 1.01–1.03)
TROPONIN T SERPL-MCNC: 0.39 NG/ML — CRITICAL HIGH
TROPONIN T SERPL-MCNC: 0.71 NG/ML — CRITICAL HIGH
TROPONIN T SERPL-MCNC: 0.88 NG/ML — CRITICAL HIGH
TROPONIN T SERPL-MCNC: 1.35 NG/ML — CRITICAL HIGH
TROPONIN T SERPL-MCNC: <0.01 NG/ML — SIGNIFICANT CHANGE UP
UROBILINOGEN FLD QL: SIGNIFICANT CHANGE UP
VARIANT LYMPHS # BLD: 11.4 % — HIGH (ref 0–5)
WBC # BLD: 14.69 K/UL — HIGH (ref 4.8–10.8)
WBC # BLD: 8.7 K/UL — SIGNIFICANT CHANGE UP (ref 4.8–10.8)
WBC # BLD: 9.31 K/UL — SIGNIFICANT CHANGE UP (ref 4.8–10.8)
WBC # BLD: 9.44 K/UL — SIGNIFICANT CHANGE UP (ref 4.8–10.8)
WBC # BLD: 9.66 K/UL — SIGNIFICANT CHANGE UP (ref 4.8–10.8)
WBC # FLD AUTO: 14.69 K/UL — HIGH (ref 4.8–10.8)
WBC # FLD AUTO: 8.7 K/UL — SIGNIFICANT CHANGE UP (ref 4.8–10.8)
WBC # FLD AUTO: 9.31 K/UL — SIGNIFICANT CHANGE UP (ref 4.8–10.8)
WBC # FLD AUTO: 9.44 K/UL — SIGNIFICANT CHANGE UP (ref 4.8–10.8)
WBC # FLD AUTO: 9.66 K/UL — SIGNIFICANT CHANGE UP (ref 4.8–10.8)

## 2021-01-01 PROCEDURE — 72125 CT NECK SPINE W/O DYE: CPT | Mod: 26

## 2021-01-01 PROCEDURE — 99497 ADVNCD CARE PLAN 30 MIN: CPT

## 2021-01-01 PROCEDURE — 93010 ELECTROCARDIOGRAM REPORT: CPT | Mod: 76

## 2021-01-01 PROCEDURE — 99291 CRITICAL CARE FIRST HOUR: CPT

## 2021-01-01 PROCEDURE — 99291 CRITICAL CARE FIRST HOUR: CPT | Mod: CS,25

## 2021-01-01 PROCEDURE — 99233 SBSQ HOSP IP/OBS HIGH 50: CPT

## 2021-01-01 PROCEDURE — 74177 CT ABD & PELVIS W/CONTRAST: CPT | Mod: 26

## 2021-01-01 PROCEDURE — 70498 CT ANGIOGRAPHY NECK: CPT | Mod: 26

## 2021-01-01 PROCEDURE — 71260 CT THORAX DX C+: CPT | Mod: 26

## 2021-01-01 PROCEDURE — 99232 SBSQ HOSP IP/OBS MODERATE 35: CPT

## 2021-01-01 PROCEDURE — 71045 X-RAY EXAM CHEST 1 VIEW: CPT | Mod: 26

## 2021-01-01 PROCEDURE — 70496 CT ANGIOGRAPHY HEAD: CPT | Mod: 26

## 2021-01-01 PROCEDURE — 70450 CT HEAD/BRAIN W/O DYE: CPT | Mod: 26,59,76

## 2021-01-01 PROCEDURE — 99222 1ST HOSP IP/OBS MODERATE 55: CPT

## 2021-01-01 PROCEDURE — 70486 CT MAXILLOFACIAL W/O DYE: CPT | Mod: 26

## 2021-01-01 PROCEDURE — 99223 1ST HOSP IP/OBS HIGH 75: CPT

## 2021-01-01 PROCEDURE — 99221 1ST HOSP IP/OBS SF/LOW 40: CPT

## 2021-01-01 PROCEDURE — 99497 ADVNCD CARE PLAN 30 MIN: CPT | Mod: 25

## 2021-01-01 PROCEDURE — 72170 X-RAY EXAM OF PELVIS: CPT | Mod: 26

## 2021-01-01 PROCEDURE — 36620 INSERTION CATHETER ARTERY: CPT

## 2021-01-01 PROCEDURE — 31500 INSERT EMERGENCY AIRWAY: CPT

## 2021-01-01 RX ORDER — PROPOFOL 10 MG/ML
3 INJECTION, EMULSION INTRAVENOUS
Qty: 1000 | Refills: 0 | Status: DISCONTINUED | OUTPATIENT
Start: 2021-01-01 | End: 2021-01-01

## 2021-01-01 RX ORDER — CLEVIDIPINE BUTYRATE 50MG/100ML
1 VIAL (ML) INTRAVENOUS
Qty: 25 | Refills: 0 | Status: DISCONTINUED | OUTPATIENT
Start: 2021-01-01 | End: 2021-01-01

## 2021-01-01 RX ORDER — LEVETIRACETAM 250 MG/1
1000 TABLET, FILM COATED ORAL ONCE
Refills: 0 | Status: COMPLETED | OUTPATIENT
Start: 2021-01-01 | End: 2021-01-01

## 2021-01-01 RX ORDER — PROPOFOL 10 MG/ML
5 INJECTION, EMULSION INTRAVENOUS
Qty: 1000 | Refills: 0 | Status: DISCONTINUED | OUTPATIENT
Start: 2021-01-01 | End: 2021-01-01

## 2021-01-01 RX ORDER — GLUCAGON INJECTION, SOLUTION 0.5 MG/.1ML
1 INJECTION, SOLUTION SUBCUTANEOUS ONCE
Refills: 0 | Status: DISCONTINUED | OUTPATIENT
Start: 2021-01-01 | End: 2021-01-01

## 2021-01-01 RX ORDER — SODIUM CHLORIDE 9 MG/ML
1000 INJECTION, SOLUTION INTRAVENOUS
Refills: 0 | Status: DISCONTINUED | OUTPATIENT
Start: 2021-01-01 | End: 2021-01-01

## 2021-01-01 RX ORDER — ETOMIDATE 2 MG/ML
20 INJECTION INTRAVENOUS ONCE
Refills: 0 | Status: COMPLETED | OUTPATIENT
Start: 2021-01-01 | End: 2021-01-01

## 2021-01-01 RX ORDER — DEXTROSE 50 % IN WATER 50 %
25 SYRINGE (ML) INTRAVENOUS ONCE
Refills: 0 | Status: DISCONTINUED | OUTPATIENT
Start: 2021-01-01 | End: 2021-01-01

## 2021-01-01 RX ORDER — ROBINUL 0.2 MG/ML
0.4 INJECTION INTRAMUSCULAR; INTRAVENOUS ONCE
Refills: 0 | Status: COMPLETED | OUTPATIENT
Start: 2021-01-01 | End: 2021-01-01

## 2021-01-01 RX ORDER — SODIUM CHLORIDE 9 MG/ML
1000 INJECTION INTRAMUSCULAR; INTRAVENOUS; SUBCUTANEOUS
Refills: 0 | Status: DISCONTINUED | OUTPATIENT
Start: 2021-01-01 | End: 2021-01-01

## 2021-01-01 RX ORDER — DEXTROSE 50 % IN WATER 50 %
15 SYRINGE (ML) INTRAVENOUS ONCE
Refills: 0 | Status: DISCONTINUED | OUTPATIENT
Start: 2021-01-01 | End: 2021-01-01

## 2021-01-01 RX ORDER — SODIUM CHLORIDE 9 MG/ML
500 INJECTION INTRAMUSCULAR; INTRAVENOUS; SUBCUTANEOUS ONCE
Refills: 0 | Status: COMPLETED | OUTPATIENT
Start: 2021-01-01 | End: 2021-01-01

## 2021-01-01 RX ORDER — ROCURONIUM BROMIDE 10 MG/ML
100 VIAL (ML) INTRAVENOUS ONCE
Refills: 0 | Status: DISCONTINUED | OUTPATIENT
Start: 2021-01-01 | End: 2021-01-01

## 2021-01-01 RX ORDER — FENTANYL CITRATE 50 UG/ML
25 INJECTION INTRAVENOUS
Refills: 0 | Status: DISCONTINUED | OUTPATIENT
Start: 2021-01-01 | End: 2021-01-01

## 2021-01-01 RX ORDER — MAGNESIUM SULFATE 500 MG/ML
1 VIAL (ML) INJECTION ONCE
Refills: 0 | Status: COMPLETED | OUTPATIENT
Start: 2021-01-01 | End: 2021-01-01

## 2021-01-01 RX ORDER — ROBINUL 0.2 MG/ML
0.2 INJECTION INTRAMUSCULAR; INTRAVENOUS EVERY 6 HOURS
Refills: 0 | Status: DISCONTINUED | OUTPATIENT
Start: 2021-01-01 | End: 2021-01-01

## 2021-01-01 RX ORDER — PANTOPRAZOLE SODIUM 20 MG/1
40 TABLET, DELAYED RELEASE ORAL DAILY
Refills: 0 | Status: DISCONTINUED | OUTPATIENT
Start: 2021-01-01 | End: 2021-01-01

## 2021-01-01 RX ORDER — INSULIN HUMAN 100 [IU]/ML
INJECTION, SOLUTION SUBCUTANEOUS EVERY 4 HOURS
Refills: 0 | Status: DISCONTINUED | OUTPATIENT
Start: 2021-01-01 | End: 2021-01-01

## 2021-01-01 RX ORDER — NICARDIPINE HYDROCHLORIDE 30 MG/1
2.5 CAPSULE, EXTENDED RELEASE ORAL
Qty: 40 | Refills: 0 | Status: DISCONTINUED | OUTPATIENT
Start: 2021-01-01 | End: 2021-01-01

## 2021-01-01 RX ORDER — POTASSIUM CHLORIDE 20 MEQ
20 PACKET (EA) ORAL
Refills: 0 | Status: DISCONTINUED | OUTPATIENT
Start: 2021-01-01 | End: 2021-01-01

## 2021-01-01 RX ORDER — NOREPINEPHRINE BITARTRATE/D5W 8 MG/250ML
0.01 PLASTIC BAG, INJECTION (ML) INTRAVENOUS
Qty: 8 | Refills: 0 | Status: DISCONTINUED | OUTPATIENT
Start: 2021-01-01 | End: 2021-01-01

## 2021-01-01 RX ORDER — LEVETIRACETAM 250 MG/1
500 TABLET, FILM COATED ORAL EVERY 12 HOURS
Refills: 0 | Status: DISCONTINUED | OUTPATIENT
Start: 2021-01-01 | End: 2021-01-01

## 2021-01-01 RX ORDER — MAGNESIUM SULFATE 500 MG/ML
2 VIAL (ML) INJECTION
Refills: 0 | Status: DISCONTINUED | OUTPATIENT
Start: 2021-01-01 | End: 2021-01-01

## 2021-01-01 RX ORDER — SENNA PLUS 8.6 MG/1
2 TABLET ORAL AT BEDTIME
Refills: 0 | Status: DISCONTINUED | OUTPATIENT
Start: 2021-01-01 | End: 2021-01-01

## 2021-01-01 RX ORDER — ONDANSETRON 8 MG/1
8 TABLET, FILM COATED ORAL ONCE
Refills: 0 | Status: COMPLETED | OUTPATIENT
Start: 2021-01-01 | End: 2021-01-01

## 2021-01-01 RX ORDER — DEXTROSE 50 % IN WATER 50 %
12.5 SYRINGE (ML) INTRAVENOUS ONCE
Refills: 0 | Status: DISCONTINUED | OUTPATIENT
Start: 2021-01-01 | End: 2021-01-01

## 2021-01-01 RX ORDER — MORPHINE SULFATE 50 MG/1
4 CAPSULE, EXTENDED RELEASE ORAL
Refills: 0 | Status: DISCONTINUED | OUTPATIENT
Start: 2021-01-01 | End: 2021-01-01

## 2021-01-01 RX ORDER — MORPHINE SULFATE 50 MG/1
4 CAPSULE, EXTENDED RELEASE ORAL
Qty: 100 | Refills: 0 | Status: DISCONTINUED | OUTPATIENT
Start: 2021-01-01 | End: 2021-01-01

## 2021-01-01 RX ORDER — DEXMEDETOMIDINE HYDROCHLORIDE IN 0.9% SODIUM CHLORIDE 4 UG/ML
0.1 INJECTION INTRAVENOUS
Qty: 200 | Refills: 0 | Status: DISCONTINUED | OUTPATIENT
Start: 2021-01-01 | End: 2021-01-01

## 2021-01-01 RX ORDER — LEVOTHYROXINE SODIUM 125 MCG
12.5 TABLET ORAL AT BEDTIME
Refills: 0 | Status: DISCONTINUED | OUTPATIENT
Start: 2021-01-01 | End: 2021-01-01

## 2021-01-01 RX ORDER — PROPOFOL 10 MG/ML
20 INJECTION, EMULSION INTRAVENOUS
Qty: 1000 | Refills: 0 | Status: DISCONTINUED | OUTPATIENT
Start: 2021-01-01 | End: 2021-01-01

## 2021-01-01 RX ORDER — FENTANYL CITRATE 50 UG/ML
12.5 INJECTION INTRAVENOUS EVERY 4 HOURS
Refills: 0 | Status: DISCONTINUED | OUTPATIENT
Start: 2021-01-01 | End: 2021-01-01

## 2021-01-01 RX ORDER — POTASSIUM CHLORIDE 20 MEQ
20 PACKET (EA) ORAL
Refills: 0 | Status: COMPLETED | OUTPATIENT
Start: 2021-01-01 | End: 2021-01-01

## 2021-01-01 RX ADMIN — Medication 2 MILLIGRAM(S): at 14:23

## 2021-01-01 RX ADMIN — Medication 12.5 MICROGRAM(S): at 01:18

## 2021-01-01 RX ADMIN — ROBINUL 0.2 MILLIGRAM(S): 0.2 INJECTION INTRAMUSCULAR; INTRAVENOUS at 00:04

## 2021-01-01 RX ADMIN — PROPOFOL 1.5 MICROGRAM(S)/KG/MIN: 10 INJECTION, EMULSION INTRAVENOUS at 10:30

## 2021-01-01 RX ADMIN — PROPOFOL 5.99 MICROGRAM(S)/KG/MIN: 10 INJECTION, EMULSION INTRAVENOUS at 00:04

## 2021-01-01 RX ADMIN — Medication 100 GRAM(S): at 03:11

## 2021-01-01 RX ADMIN — Medication 50 MILLIEQUIVALENT(S): at 17:15

## 2021-01-01 RX ADMIN — Medication 62.5 MILLIMOLE(S): at 06:42

## 2021-01-01 RX ADMIN — MORPHINE SULFATE 4 MILLIGRAM(S): 50 CAPSULE, EXTENDED RELEASE ORAL at 14:20

## 2021-01-01 RX ADMIN — SENNA PLUS 2 TABLET(S): 8.6 TABLET ORAL at 23:00

## 2021-01-01 RX ADMIN — PANTOPRAZOLE SODIUM 40 MILLIGRAM(S): 20 TABLET, DELAYED RELEASE ORAL at 14:21

## 2021-01-01 RX ADMIN — ROBINUL 0.2 MILLIGRAM(S): 0.2 INJECTION INTRAMUSCULAR; INTRAVENOUS at 06:40

## 2021-01-01 RX ADMIN — Medication 2 MILLIGRAM(S): at 13:38

## 2021-01-01 RX ADMIN — PROPOFOL 0.9 MICROGRAM(S)/KG/MIN: 10 INJECTION, EMULSION INTRAVENOUS at 11:33

## 2021-01-01 RX ADMIN — FENTANYL CITRATE 12.5 MICROGRAM(S): 50 INJECTION INTRAVENOUS at 03:06

## 2021-01-01 RX ADMIN — Medication 2 MILLIGRAM(S): at 14:19

## 2021-01-01 RX ADMIN — Medication 1 DROP(S): at 17:23

## 2021-01-01 RX ADMIN — INSULIN HUMAN 3: 100 INJECTION, SOLUTION SUBCUTANEOUS at 00:12

## 2021-01-01 RX ADMIN — Medication 50 MILLIEQUIVALENT(S): at 03:12

## 2021-01-01 RX ADMIN — Medication 2 MG/HR: at 18:10

## 2021-01-01 RX ADMIN — SODIUM CHLORIDE 1000 MILLILITER(S): 9 INJECTION INTRAMUSCULAR; INTRAVENOUS; SUBCUTANEOUS at 06:45

## 2021-01-01 RX ADMIN — Medication 1 DROP(S): at 05:59

## 2021-01-01 RX ADMIN — SODIUM CHLORIDE 1000 MILLILITER(S): 9 INJECTION INTRAMUSCULAR; INTRAVENOUS; SUBCUTANEOUS at 10:57

## 2021-01-01 RX ADMIN — Medication 1 DROP(S): at 17:14

## 2021-01-01 RX ADMIN — Medication 1 DROP(S): at 14:32

## 2021-01-01 RX ADMIN — Medication 1 DROP(S): at 00:47

## 2021-01-01 RX ADMIN — Medication 50 GRAM(S): at 06:42

## 2021-01-01 RX ADMIN — LEVETIRACETAM 420 MILLIGRAM(S): 250 TABLET, FILM COATED ORAL at 17:53

## 2021-01-01 RX ADMIN — MORPHINE SULFATE 4 MILLIGRAM(S): 50 CAPSULE, EXTENDED RELEASE ORAL at 12:33

## 2021-01-01 RX ADMIN — Medication 50 MILLIEQUIVALENT(S): at 19:00

## 2021-01-01 RX ADMIN — PANTOPRAZOLE SODIUM 40 MILLIGRAM(S): 20 TABLET, DELAYED RELEASE ORAL at 10:58

## 2021-01-01 RX ADMIN — ETOMIDATE 20 MILLIGRAM(S): 2 INJECTION INTRAVENOUS at 10:20

## 2021-01-01 RX ADMIN — Medication 1 DROP(S): at 10:58

## 2021-01-01 RX ADMIN — Medication 50 MILLIEQUIVALENT(S): at 14:51

## 2021-01-01 RX ADMIN — MORPHINE SULFATE 4 MG/HR: 50 CAPSULE, EXTENDED RELEASE ORAL at 00:04

## 2021-01-01 RX ADMIN — ROBINUL 0.2 MILLIGRAM(S): 0.2 INJECTION INTRAMUSCULAR; INTRAVENOUS at 19:16

## 2021-01-01 RX ADMIN — LEVETIRACETAM 420 MILLIGRAM(S): 250 TABLET, FILM COATED ORAL at 06:06

## 2021-01-01 RX ADMIN — Medication 2 MILLIGRAM(S): at 12:33

## 2021-01-01 RX ADMIN — LEVETIRACETAM 420 MILLIGRAM(S): 250 TABLET, FILM COATED ORAL at 06:47

## 2021-01-01 RX ADMIN — Medication 2 MILLIGRAM(S): at 13:15

## 2021-01-01 RX ADMIN — MORPHINE SULFATE 4 MG/HR: 50 CAPSULE, EXTENDED RELEASE ORAL at 13:00

## 2021-01-01 RX ADMIN — SODIUM CHLORIDE 100 MILLILITER(S): 9 INJECTION INTRAMUSCULAR; INTRAVENOUS; SUBCUTANEOUS at 12:36

## 2021-01-01 RX ADMIN — DEXMEDETOMIDINE HYDROCHLORIDE IN 0.9% SODIUM CHLORIDE 1.25 MICROGRAM(S)/KG/HR: 4 INJECTION INTRAVENOUS at 16:02

## 2021-01-01 RX ADMIN — ONDANSETRON 8 MILLIGRAM(S): 8 TABLET, FILM COATED ORAL at 08:15

## 2021-01-01 RX ADMIN — ROBINUL 0.2 MILLIGRAM(S): 0.2 INJECTION INTRAMUSCULAR; INTRAVENOUS at 14:31

## 2021-01-01 RX ADMIN — LEVETIRACETAM 420 MILLIGRAM(S): 250 TABLET, FILM COATED ORAL at 17:23

## 2021-01-01 RX ADMIN — ROBINUL 0.2 MILLIGRAM(S): 0.2 INJECTION INTRAMUSCULAR; INTRAVENOUS at 15:08

## 2021-01-01 RX ADMIN — Medication 1 DROP(S): at 06:46

## 2021-01-01 RX ADMIN — MORPHINE SULFATE 4 MILLIGRAM(S): 50 CAPSULE, EXTENDED RELEASE ORAL at 13:38

## 2021-01-01 RX ADMIN — PROPOFOL 5.99 MICROGRAM(S)/KG/MIN: 10 INJECTION, EMULSION INTRAVENOUS at 19:13

## 2021-01-01 RX ADMIN — Medication 1 DROP(S): at 01:23

## 2021-01-01 RX ADMIN — Medication 0.94 MICROGRAM(S)/KG/MIN: at 06:45

## 2021-01-01 RX ADMIN — LEVETIRACETAM 400 MILLIGRAM(S): 250 TABLET, FILM COATED ORAL at 10:42

## 2021-01-01 RX ADMIN — NICARDIPINE HYDROCHLORIDE 12.5 MG/HR: 30 CAPSULE, EXTENDED RELEASE ORAL at 10:30

## 2021-01-01 RX ADMIN — MORPHINE SULFATE 4 MG/HR: 50 CAPSULE, EXTENDED RELEASE ORAL at 15:06

## 2021-01-01 RX ADMIN — Medication 12.5 MICROGRAM(S): at 23:39

## 2021-01-01 RX ADMIN — PROPOFOL 5.99 MICROGRAM(S)/KG/MIN: 10 INJECTION, EMULSION INTRAVENOUS at 08:30

## 2021-01-01 RX ADMIN — ROBINUL 0.4 MILLIGRAM(S): 0.2 INJECTION INTRAMUSCULAR; INTRAVENOUS at 12:33

## 2021-01-01 RX ADMIN — Medication 25 GRAM(S): at 04:04

## 2021-01-01 RX ADMIN — Medication 1 DROP(S): at 18:10

## 2021-02-24 NOTE — H&P ADULT - NSICDXFAMILYHX_GEN_ALL_CORE_FT
FAMILY HISTORY:  Father  Still living? No  CVA (cerebral vascular accident), Age at diagnosis: Age Unknown    Mother  Still living? Unknown  Family history of stomach cancer, Age at diagnosis: Age Unknown

## 2021-02-24 NOTE — ED PROCEDURE NOTE - PROCEDURE NAME, MLM
Tracheal Intubation
Gastric Intubation/Gastric Lavage
Arterial Puncture/Cannulation
Point of Care Ultrasound Other

## 2021-02-24 NOTE — ED PROVIDER NOTE - CRITICAL CARE ATTENDING CONTRIBUTION TO CARE
I have personally seen and examined this patient.  I have fully participated in the care of this patient. I have reviewed all pertinent clinical information, including history, physical exam, plan and the PA's note and agree except as noted          82 yo f hx hld, htn, hypothyroid, anxiety, on asa 81mg  pt presents s/p fall on face. pt w/ some amnesia to the event and is unsure if she had preceeding sx or syncope. now, pt w/o complaints apart from some mild headache, eyelid swelling and nausea. no vomiting or neck pain. trauma alert prenotification was called.    Trauma alert  VS  A: intact, protected  B: breath sounds equal b/l, no cyanosis  C: extremities warm and well perfused  D: GCS 15  E:    H:   Eye: b/l eyelid swelling and ecchymosis, visual acuity grossly normal, OD: EOMI, PERRL, IOP 32, ONSD 5.6mm, OS: mild decrease in left gaze, reactive, chemotic, otherwise intact, IOP 29, ONSD 4mm  N: no nasal septal hematoma  T: oropharynx clear  Card: RRR, nml s1s2  Pulm: CTAB, breath sounds equal  Abd: S, NT, ND  Spine: no C/T/L spine TTP  Pelvis: stable  Extremities: no gross deformities  Neuro: Awake, alert, orientedx3, no gross focal neuro neuro deficits, moving all extremities    Plan:  Trauma labs  CXR, Pelvis XR  CT panscan w/ maxface/orbit  optho consult  continue to monitor visual acuity, no perioribital hematoma on u/s, will better eval w/ CT    Dispo pending w/u

## 2021-02-24 NOTE — ED PROVIDER NOTE - PHYSICAL EXAMINATION
Constitutional: Well developed, well nourished. NAD  TRAUMA: ABC intact. GCS 15.   Head: Normocephalic, b/l oribit edema and acchymosis  Eyes: PERRL. EOMI. +Raccoon eyes, VA right 20/100. BA Left 20/50, no uptake on stain, visual fields intact, pressure to right eye 32, pressure to left eye 29, to palpation soft orbits.   ENT: No nasal discharge. No septal hematoma. No Ponce sign. Mucous membranes moist.  Neck: Supple. Painless ROM. No midline tenderness or stepoffs.  Cardiovascular: Normal S1, S2. Regular rate and rhythm. No murmurs, rubs, or gallops.  Pulmonary: Normal respiratory rate and effort. Lungs clear to auscultation bilaterally. No wheezing, rales, or rhonchi.  CHEST: No chest wall tenderness or crepitus.  Abdominal: Soft. Nondistended. Nontender. No rebound, guarding, or rigidity.  BACK: No midline T/L/S tenderness or stepoffs. No saddle paresthesia.  Extremities. Pelvis stable. No traumatic deformities or tenderness of extremities.  Skin: No rashes, lacerations, or abrasions. Ecchymosis b/l orbits.   Neuro: AAOx3. Strength 5/5 in all extremities. Sensation intact throughout. No focal neurological deficits.  Psych: Normal mood. Normal affect.

## 2021-02-24 NOTE — CONSULT NOTE ADULT - ASSESSMENT
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 L retrobulbar hemorrhage vs globe rupture       NEURO:   Sedation - wean off Propofol for exam - start on Precedex if needed  Pain - controlled with Fent prn  Nsx c/s: q1h neurochecks, load Keppra 1g, continue with 500BID, rpt CTH in 6 hours (3pm), 1 platelet for ASA reversal, no mannitol  -f/u rpt CTH   Optho following - globe intact, no rupture, no concern for hemorrhage - started on artificial tears ointment  OMFS c/s pending  Hx of anxiety - holding home Lorazepam while sedated    RESP:   Intubated for airway protection   Vent:  AM ABG  AM CXR    CARDS:   Acute HTN - on Cleviprex gtt  -maintain SBP < 160, MAP > 65  Hx of HLD - on home statin  trop neg x1 - f/u 2 additional sets  NM Stress test (1/2020): EF > 55%, negative stress test  EKG: NSR, QTc 486    GI/NUTR:   NPO  OGT to LCS  PPI  Senna    /RENAL:   Griggs - strict I&O  NS @ 100cc/hr  BUN/Cr 12/0.8  Lactic acidosis - lactate 2.6 - continue to trend  UA neg (+glucose, +ketones)  Monitor & replete elytes prn    HEME/ONC:   H/H 12/38   DVT ppx: holding as per neurosurgery  holding home ASA  -reversed w/ 1u platelets as per neurosurgery request    ID:   Afebrile  WBC 8  No abx at this time  COVID neg  UA neg    ENDO:  Hx of hypothyroidism - on home Synthroid  FS q4h while NPO - ISS if needed, A1C pending      LINES/DRAINS: ETT, OGT, R Anson Rider PIV    DISPO: SICU, d/w Dr. Wisdom Assessment & Plan    82y/o Female s/p witnessed fall (slip on ice), intubated 2/2 decompensated mental status, wiith acute findings of:  -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 L retrobulbar hemorrhage vs globe rupture       NEURO:   Sedation - wean off Propofol for exam - start on Precedex if needed  Pain - controlled with Fent prn  Nsx c/s: q1h neurochecks, load Keppra 1g, continue with 500BID, rpt CTH in 6 hours (3pm), 1 platelet for ASA reversal, no mannitol  -f/u rpt CTH   Optho following - globe intact, no rupture, no concern for hemorrhage - started on artificial tears ointment  OMFS c/s pending  Hx of anxiety - holding home Lorazepam while sedated    RESP:   Intubated for airway protection   Vent: 380/16/100/5  AM ABG  AM CXR    CARDS:   Acute HTN - on Cleviprex gtt  -maintain SBP < 160, MAP > 65  Hx of HLD - on home statin  trop neg x1 - f/u 2 additional sets  NM Stress test (1/2020): EF > 55%, negative stress test  EKG: NSR, QTc 486    GI/NUTR:   NPO  OGT to LCS  PPI  Senna    /RENAL:   Griggs - strict I&O  NS @ 100cc/hr  BUN/Cr 12/0.8  Lactic acidosis - lactate 2.6 - continue to trend  UA neg (+glucose, +ketones)  Monitor & replete elytes prn    HEME/ONC:   H/H 12/38   DVT ppx: holding as per neurosurgery  holding home ASA  -reversed w/ 1u platelets as per neurosurgery request    ID:   Afebrile  WBC 8  No abx at this time  COVID neg  UA neg    ENDO:  Hx of hypothyroidism - on home Synthroid  FS q4h while NPO - ISS if needed, A1C pending      LINES/DRAINS: ETT, OGT, R Anson Rider, JOHN    DISPO: SICU, d/w Dr. Wisdom

## 2021-02-24 NOTE — CONSULT NOTE ADULT - SUBJECTIVE AND OBJECTIVE BOX
HPI:  81y old f s/p witnessed fall on ice this morning +HT, +LOC,+ASA. She was seen walking on the ice and fell backwards hitting the back of her head. She does not remember the fall but presents with GCS of 15 and bilateral upper eyelid hematomas. Sh    PAST MEDICAL & SURGICAL HISTORY:  Hypothyroidism  OA (osteoarthritis)  Anxiety  GERD (gastroesophageal reflux disease)  Hypercholesteremia  H/O colonoscopy 6 MO AGO  H/O abdominal hysterectomy 2008    Allergies  latex (Hives)  No Known Drug Allergies    Home Medications:  aspirin 81 mg oral tablet: 1 tab(s) orally once a day (06 Apr 2018 11:09)  Crestor 10 mg oral tablet: 1 tab(s) orally once a day (at bedtime) (06 Apr 2018 11:09)  LORazepam 0.5 mg oral tablet: 1 tab(s) orally 2 times a day, As Needed (06 Apr 2018 11:09)  metoprolol tartrate 25 mg oral tablet:  (06 Apr 2018 11:09)  Synthroid 25 mcg (0.025 mg) oral tablet: 1 tab(s) orally once a day (06 Apr 2018 11:09)  Vitamin D3 1000 intl units oral tablet: 1 tab(s) orally once a day (06 Apr 2018 11:09)    CT HEAD:  Large acute intraparenchymal hematoma/contusion in the inferior bifrontal lobes (left larger than right), and in the anterior frontal parietal lobe with moderate mass effect and surrounding edema.    Small scattered acute subarachnoid hemorrhages along the bifrontal sulci, anterior interhemispheric fissure, right frontoparietal sulci, and in the interpeduncular cistern.    Small acute subdural hemorrhages along the left frontal convexity and along the falx.    Nondisplaced right parietal calvarial fracture extending to the right coronal suture with overlying right parietal scalp hematoma.    CT FACIAL BONES:  Nondisplaced fracture lines involving the bilateral superior orbital walls and left lateral orbital wall.    Posttraumatic fluid lining the bilateral supraorbital wall and the left lateral orbital wall in the extraconal space subjacent to the nondisplaced fractures. Mild left-sided asymmetric proptosis    Patchy stranding in the left intraconal space posterior to the globe, nonspecific, could reflect retrobulbar hemorrhage but may also be seen with globe rupture. Ophthalmologic evaluation is recommended.      Patient is intubated  Eye Exam   Unable to check vision.   bilateral upper eye lid hematomas     OS Conjunctival Hemorrhage    IOP OD 17 OS 24  via Icare  PERRL NO APD  Pupils are sluggish in movement.   Globe is intact.

## 2021-02-24 NOTE — ED PROVIDER NOTE - CLINICAL SUMMARY MEDICAL DECISION MAKING FREE TEXT BOX
Patient presents after an unwitnessed fall. trauma alert called on arrival. labs, xray, ct done. Patient re-examined and found to have left sided deficits. Mentation beginning to decline. CT shows multiple brain bleeds. Neurosurgery, trauma, optho and omfs consulted. no increased IOP. Patient intubated for airway protections. keppra given, cardene started. patient admitted to the SICU for further management. Attempts made to contact family, no answer.

## 2021-02-24 NOTE — ED PROCEDURE NOTE - ATTENDING CONTRIBUTION TO CARE
I was present for and supervised the key and critical aspects of the procedures performed during the care of the patient.

## 2021-02-24 NOTE — CONSULT NOTE ADULT - ASSESSMENT
No Compartment syndrome.   Will monitor.  No Compartment syndrome.   Will monitor.   Recommend Artificial tears ointment to protect the cornea. use q6h OU.

## 2021-02-24 NOTE — CONSULT NOTE ADULT - ATTENDING COMMENTS
82 y/o female, S/P Fall.  Traumatic Bilateral IPH of the brain.  Traumatic Bilateral SAHs  Traumatic Left SDH.  Bilateral Orbital Wall Fractures  Intubated for airway protection.  On [employer] Ventilation.  Elevated Troponin  Hypertensive urgency    PLAN:  - intermittent neuro checks   - Keppra  - keep normotensive  - use Cleviprex as needed  - NPO  - follow serum electrolytes and UOP  Devastating IICHs  Poor prognosis.

## 2021-02-24 NOTE — ED PROCEDURE NOTE - PROCEDURE DATE TIME, MLM
24-Feb-2021 09:15
24-Feb-2021 15:32
24-Feb-2021 16:40
24-Feb-2021 16:39
Imiquimod Counseling:  I discussed with the patient the risks of imiquimod including but not limited to erythema, scaling, itching, weeping, crusting, and pain.  Patient understands that the inflammatory response to imiquimod is variable from person to person and was educated regarded proper titration schedule.  If flu-like symptoms develop, patient knows to discontinue the medication and contact us.

## 2021-02-24 NOTE — ED PROVIDER NOTE - NS ED ROS FT
Constitutional: (-) fever, (-) chills  Eyes: (-) visual changes  ENT: (-) nasal congestions  Cardiovascular: (-) chest pain, (-) syncope  Respiratory: (-) cough, (-) shortness of breath, (-) dyspnea,   Gastrointestinal: (-) vomiting, (-) diarrhea, (+)nausea,  Musculoskeletal: (-) neck pain, (-) back pain, (-) joint pain,  Integumentary: (-) rash, (+) edema, (-) bruises  Neurological: (-) headache, (+) loc, (-) dizziness, (-) tingling, (-)numbness,  Peripheral Vascular: (-) leg swelling  :  (-)dysuria,  (-) hematuria  Allergic/Immunologic: (-) pruritus

## 2021-02-24 NOTE — H&P ADULT - NSHPLABSRESULTS_GEN_ALL_CORE
LABS:                       12.6   8.70  )-----------( 171      ( 24 Feb 2021 08:03 )             38.1     02-24    142  |  104  |  12  ----------------------------<  152<H>  3.3<L>   |  23  |  0.8    Ca    9.2      24 Feb 2021 08:03    TPro  7.0  /  Alb  4.4  /  TBili  0.5  /  DBili  x   /  AST  37  /  ALT  20  /  AlkPhos  100  02-24    PT/INR - ( 24 Feb 2021 08:03 )   PT: 12.50 sec;   INR: 1.09 ratio    PTT - ( 24 Feb 2021 08:03 )  PTT:32.0 sec    Lactate, Blood: 2.6 mmol/L (02-24 @ 08:03)    RADIOLOGY, EKG & ADDITIONAL TESTS: Reviewed.   < from: Xray Chest 1 View AP/PA (02.24.21 @ 08:44) >  IMPRESSION:  No radiographic evidence of acute cardiopulmonary disease.  < end of copied text >    < from: Xray Pelvis AP only (02.24.21 @ 08:44) >  Impression:  No evidence of acute fracture. Osteopenia.  < end of copied text >    < from: CT Head No Cont (02.24.21 @ 09:23) >  IMPRESSION:  CT HEAD:  Large acute intraparenchymal hematoma/contusion in the inferior bifrontal lobes (left larger than right), and in the anterior frontal parietal lobe with moderate mass effect and surrounding edema.  Small scattered acute subarachnoid hemorrhages along the bifrontal sulci, anterior interhemispheric fissure, right frontoparietal sulci, and in the interpeduncular cistern.  Small acute subdural hemorrhages along the left frontal convexity and along the falx.  Nondisplaced right parietal calvarial fracture extending to the right coronal suture with overlying right parietal scalp hematoma.    CT FACIAL BONES:  Nondisplaced fracture lines involving the bilateral superior orbital walls and left lateral orbital wall.  Posttraumatic fluid lining the bilateral supraorbital wall and the left lateral orbital wall in the extraconal space subjacent to the nondisplaced fractures. Mild left-sided asymmetric proptosis  Patchy stranding in the left intraconal space posterior to the globe, nonspecific, could reflect retrobulbar hemorrhage but may also be seen with globe rupture. Ophthalmologic evaluation is recommended.    CT CERVICAL SPINE:  No acute cervical fracture or dislocation.  Moderate degenerative changes of the cervical spine.  < end of copied text >    < from: CT Chest w/ IV Cont (02.24.21 @ 09:32) >  IMPRESSION:  1.  Apparent mural thickening of the transverse and left colon may be on the basis of underdistention or represent colitis. Correlate clinically.  2.  Nonspecific small pelvic ascites.  3.  Otherwise no specific evidence for acute traumatic injury in the chest abdomen or pelvis.  4.  4 mm left apical lung nodule. 12 month follow-up can be obtained. In low-risk patients no routine follow-up needed. In high-risk patients chest CT can beperformed in 12 months.  5.  See body of the report for additional findings.  < end of copied text >    < from: Xray Chest 1 View-PORTABLE IMMEDIATE (Xray Chest 1 View-PORTABLE IMMEDIATE .) (02.24.21 @ 10:57) >  Impression:  Endotracheal tube tip extends into right mainstem bronchus; repositioning recommended.  Enteric tube extends below diaphragm.  < end of copied text >

## 2021-02-24 NOTE — ED PROVIDER NOTE - ATTENDING CONTRIBUTION TO CARE
80 yo f hx hld, htn, hypothyroid, anxiety, on asa 81mg  pt presents s/p fall on face. pt w/ some amnesia to the event and is unsure if she had preceeding sx or syncope. now, pt w/o complaints apart from some mild headache, eyelid swelling and nausea. no vomiting or neck pain. trauma alert prenotification was called.    Trauma alert  VS  A: intact, protected  B: breath sounds equal b/l, no cyanosis  C: extremities warm and well perfused  D: GCS 15  E:    H:   Eye: b/l eyelid swelling and ecchymosis, visual acuity grossly normal, OD: EOMI, PERRL, IOP 32, ONSD 5.6mm, OS: mild decrease in left gaze, reactive, chemotic, otherwise intact, IOP 29, ONSD 4mm  N: no nasal septal hematoma  T: oropharynx clear  Card: RRR, nml s1s2  Pulm: CTAB, breath sounds equal  Abd: S, NT, ND  Spine: no C/T/L spine TTP  Pelvis: stable  Extremities: no gross deformities  Neuro: Awake, alert, orientedx3, no gross focal neuro neuro deficits, moving all extremities    Plan:  Trauma labs  CXR, Pelvis XR  CT panscan w/ maxface/orbit  optho consult  continue to monitor visual acuity, no perioribital hematoma on u/s, will better eval w/ CT    Dispo pending w/u

## 2021-02-24 NOTE — ED ADULT NURSE NOTE - OBJECTIVE STATEMENT
82 y/o female BIBA for fall. Patient was found by bystander on the floor outside. Patient does not recall falling + LOC, + head truama. Patient has burising to bilateral orbital regions and hematoma to back of head. patient on arrival is nauseous and vomiting.

## 2021-02-24 NOTE — H&P ADULT - NSHPPHYSICALEXAM_GEN_ALL_CORE
Secondary Survey:   General: intubated and sedated   HEENT:  EOMI, PEERLA. bilateral upper eye lid hematomas   Neck: Soft, midline trachea. no cspine tenderness  Chest: No chest wall tenderness. or subq  emphysema   Cardiac: S1, S2, tachycardic  Respiratory: Bilateral breath sounds, clear and equal bilaterally intuabted, ETT in place  Abdomen: Soft, non-distended, non-tender, no rebound,   Groin: Normal appearing, pelvis stable   Ext: palp radial b/l UE, b/l DP palp in Lower Extrem.   Back: no TTP, no palpable runoff/stepoff/deformity

## 2021-02-24 NOTE — ED PROVIDER NOTE - PROGRESS NOTE DETAILS
Dr. Clemons, optho aware of case. Received sign out from Dr. Bledsoe pending labs, imaging and reassessment. Patietn evaluated, currently no pain. + ecchymosis around both eyes. Opthomology aware, no intervention at this time, will follow. Will expedite ct scan. neurosurg at bedside. sheila hahn and stephanie as per neuro surg, keppra, plts for asa reversal, a line, bp goal <160 sbp, as per neuro surg, keppra, plts for asa reversal, a line, bp goal <160 sbp, no manitol. pt acutely decompensated; gcs <4, intubated; pressure in eyes decreased 12 to right and 6 to left; concerned for globe rupture. will contact optho again and omfs. sVidyaw dr walker about concern will reach out to optho. s/w Dr. Chairez will send images. Deonte at bedside, repeat pressure in edye 26 in left, no rec for now will come back later in afternoon. [facility] dr. ramirez aware no further recs. s/w dr. robb, ct images sent aware of dr chambers plan d/w dr robb, reviewed images and dr chambers notes, at this time not concerned for globe rupture.

## 2021-02-24 NOTE — PROGRESS NOTE ADULT - SUBJECTIVE AND OBJECTIVE BOX
repeat CT reviewed.    Multiple large contusions  SAH  bilateral hemorrhages    Prognosis grim based on appearance of CT    No indication for neurosurgical intervention in this elderly female with multiple bilateral areas of hemorrhage

## 2021-02-24 NOTE — H&P ADULT - HISTORY OF PRESENT ILLNESS
81y old f s/p witnessed fall on ice this morning +HT, +LOC,+ASA. She was seen walking on the ice and fell backwards hitting the backof her head. She does not remember the fall but presents with GCS of 15 and bilateral upper eyelid hematomas. She was not complaining of any pain. However after she returned from CT she became unresponsive and required intubation. An arterial line was placed and she was started on a cardizem gtt for tachycardia. ICU and Neurosurgery were consulted

## 2021-02-24 NOTE — ED PROVIDER NOTE - CARE PLAN
Principal Discharge DX:	Intracranial bleed  Secondary Diagnosis:	Orbital wall fracture  Secondary Diagnosis:	Globe ptosis

## 2021-02-24 NOTE — CONSULT NOTE ADULT - SUBJECTIVE AND OBJECTIVE BOX
Leydi Alvarez is a 4 month old female who was brought in for this visit. History was provided by the CAREGIVER  HPI:   Patient presents with:  Cough: with cold symptoms x 3 days  Fever: taken on sunday high of 100.4 taken tympanic.        HPI    No longer appropriate for age      ASSESSMENT AND PLAN:  Diagnoses and all orders for this visit:    Viral upper respiratory tract infection        advised to go to ER if worse no need to return if treatment plan corrects reason for visit rest antipyretics/analgesic   HISTORY OF PRESENT ILLNESS:  82 yo female with PMHx of anxiety on ativan, on ASA, s/p witnessed fall on ice this morning.  Pt was seen walking on the ice and fell backwards hitting the back of her head. She does not remember the fall but presented initially with GCS of 15 and bilateral upper eyelid hematomas. Pt without specific pain or radiation; pt states + nausea. Denies neck pain, back pain, when eyes are open no blurry vision or visual field deficits, joint pain, cp, sob, abd pain, vomiting.  Now patient slightly confused, AOx2 (self, day, but incorrect in location and year).  Increasing R periorbital ecchymosis.   CTH revealed bilateral frontal intraparenchymal hemorrhages, as well as right parietal hemorrhage. Also noted multiple acute subarachnoid hemorrhages.       PAST MEDICAL & SURGICAL HISTORY:  Hypothyroidism    OA (osteoarthritis)    Anxiety    GERD (gastroesophageal reflux disease)    Hypercholesteremia    H/O colonoscopy  6 MO AGO    H/O abdominal hysterectomy  2008      FAMILY HISTORY:  Family history of stomach cancer (Mother)    CVA (cerebral vascular accident) (Father)        SOCIAL HISTORY:  Tobacco Use:  EtOH use:   Substance:    Allergies    latex (Hives)  No Known Drug Allergies    Intolerances        REVIEW OF SYSTEMS  unable to assess due to patient's mental status change      MEDICATIONS:  Antibiotics:    Neuro:  levETIRAcetam  IVPB 1000 milliGRAM(s) IV Intermittent once    Anticoagulation:    OTHER:    IVF:      Vital Signs Last 24 Hrs  T(C): 36.4 (24 Feb 2021 08:05), Max: 36.4 (24 Feb 2021 07:56)  T(F): 97.5 (24 Feb 2021 08:05), Max: 97.5 (24 Feb 2021 07:56)  HR: 81 (24 Feb 2021 09:14) (81 - 88)  BP: 166/75 (24 Feb 2021 09:14) (166/75 - 182/89)  BP(mean): --  RR: 21 (24 Feb 2021 09:14) (17 - 21)  SpO2: 100% (24 Feb 2021 09:14) (99% - 100%)    PHYSICAL EXAM:  NAD  AOx2 (self, day but incorrect year, location)  speech clear  Head: normocephalic, atraumatic  PERRLA, pupils 3 mm b/l, brisk  EOMI, no nystagmus  b/l periorbital ecchymosis  Face symmetric, tongue thrust midline  facial sensation intact  Motor LUE 5/5, follows commands   RUE flaccid, no spontaneous movement   LLE 5/5, RLE 2/5  sens intact in all extremities to noxious stimuli  No C-spine tenderness        LABS:                        12.6   8.70  )-----------( 171      ( 24 Feb 2021 08:03 )             38.1     02-24    142  |  104  |  12  ----------------------------<  152<H>  3.3<L>   |  23  |  0.8    Ca    9.2      24 Feb 2021 08:03    TPro  7.0  /  Alb  4.4  /  TBili  0.5  /  DBili  x   /  AST  37  /  ALT  20  /  AlkPhos  100  02-24    PT/INR - ( 24 Feb 2021 08:03 )   PT: 12.50 sec;   INR: 1.09 ratio         PTT - ( 24 Feb 2021 08:03 )  PTT:32.0 sec    CULTURES:      RADIOLOGY & ADDITIONAL STUDIES:    < from: CT Head No Cont (02.24.21 @ 09:23) >    INTERPRETATION:  CLINICAL INDICATION: Status post trauma    TECHNIQUE: CT of the head, face and cervical spine was performed without the administration of intravenous contrast.    COMPARISON: None available    FINDINGS:    CT HEAD:    There are large acute intraparenchymal hematoma/contusion in the inferior bifrontal lobes (left larger than right) with moderate mass effect and surrounding edema. There are small scattered subarachnoid hemorrhages along the adjacent bifrontal sulci, anterior interhemispheric fissure, as well as in the interpeduncular cistern. There is small subdural hemorrhage along the left frontalconvexity, as well as along the cerebral falx. There is minimal rightward midline shift measuring 0.4 cm.    There is an additional focus of intraparenchymal hemorrhage in the anterior right parietal lobe with moderate mass effect and surrounding edema. There is trace subarachnoid hemorrhage in the adjacent frontoparietal sulci.    There is trace bifrontal pneumocephalus.    No acute transcortical infarction. No hydrocephalus.      CT FACE:    SKIN AND SUBCUTANEOUS SOFT TISSUES: There are moderate sized bilateral preseptal soft tissue hematoma and right parietal scalp hematoma.    OSSEOUS STRUCTURES: There is a nondisplaced right parietal calvarial fracture extending to the right coronal suture. There are nondisplaced fracture lines involvingthe bilateral superior orbital wall, as well as a left lateral orbital wall.    There is moderate degenerative change of the bilateral TMJ joints, left worse than right. No dislocation or destructive lesion.    ORBITS: There is mild soft tissue density lining the bilateral superior extraconal space subjacent to the nondisplaced superior orbital wall fractures, as well as along the lateral extraconal space subjacent to the nondisplaced left lateral orbital wall fracture. There is patchy stranding posterior to the left globe within the intraconal space.    Also noted is mild asymmetry proptosis of the left globe.    The extraocular muscles, and optic nerve sheath complexes are intact and normal in morphology.    PARANASAL SINUSES: There is nearcomplete obscuration of the right sphenoid sinus with fluid levels.    MASTOID AIR CELLS: Clear.    CT CERVICAL SPINE:  There is straightening of the cervical spine with trace anterolisthesis of C3 on C4.    There is degenerative fusion of C6 and C7 vertebral bodies.    No acute fracture or dislocation.    Multilevel endplate degenerative changes are noted.    Moderate multilevel endplate degenerative changes as evidenced by disc space narrowing, uncovertebral hypertrophy and facet arthropathy.    No large disc osteophyte complex or critical spinal canal stenosis is identified.    Prevertebral and paraspinal soft tissues are unremarkable.      IMPRESSION:    CT HEAD:  Large acute intraparenchymal hematoma/contusion in the inferior bifrontal lobes (left larger than right), and in the anterior frontal parietal lobe with moderate mass effect and surrounding edema.    Small scattered acute subarachnoid hemorrhages along the bifrontal sulci, anterior interhemispheric fissure, right frontoparietal sulci, and in the interpeduncular cistern.    Small acute subdural hemorrhages along the left frontal convexity and along the falx.    Nondisplaced right parietal calvarial fracture extending to the right coronal suture with overlying right parietal scalp hematoma.    CT FACIAL BONES:  Nondisplaced fracture lines involving the bilateral superior orbital walls and left lateral orbital wall.    Posttraumatic fluid lining the bilateral supraorbital wall and the left lateral orbital wall in the extraconal space subjacent to the nondisplaced fractures. Mild left-sided asymmetric proptosis    Patchy stranding in the left intraconal space posterior to the globe, nonspecific, could reflect retrobulbar hemorrhage but may also be seen with globe rupture. Ophthalmologic evaluation is recommended.      CT CERVICAL SPINE:  No acute cervical fracture or dislocation.    Moderate degenerative changes of the cervical spine.      Spoke with MARYLIN TOLENTINO on 2/24/2021 10:05 AM with readback.              JAX MEDINA M.D., ATTENDING RADIOLOGIST  This document has been electronically signed. Feb 24 2021 10:09AM    < end of copied text >      Assessment: 82 yo female, on ASA, s/p fall with loss of consciousness, presenting with bilateral frontal  acute intraparenchymal hematomas, and R parietal lobe with mass effect and edema, as well as multiple acute SAH, SDH and a nondisplaced right parietal calvarial fracture.     Plan:  - ICU for neurochecks q1h  - reverse ASA STAT  - CTA head/neck   - repeat CTH in 6 hrs  - Keppra 1g loading dose, then 500mg BID  - strict BP control for goal SBP <160  - f/u labs  - f/u optho  - d/w attending

## 2021-02-24 NOTE — CONSULT NOTE ADULT - SUBJECTIVE AND OBJECTIVE BOX
81y f  TRAUMA ACTIVATION LEVEL:  Alert    MECHANISM OF INJURY:      [] Blunt  	[] MVC	[x] Fall	[] Pedestrian Struck	[] Motorcycle   [] Assault   [] Bicycle collision  [] Sports injury     [] Penetrating  	[] Gun Shot Wound 		[] Stab Wound    GCS: 	E: 4	V: 5	M: 6    HPI:  81y old f s/p witnessed fall on ice this morning +HT, +LOC,+ASA. She was seen walking on the ice and fell backwards hitting the backof her head. She does not remember the fall but presents with GCS of 15 and bilateral upper eyelid hematomas. She is not complaining of any pain.     PAST MEDICAL & SURGICAL HISTORY:  Hypothyroidism  OA (osteoarthritis)  Anxiety  GERD (gastroesophageal reflux disease)  Hypercholesteremia  H/O colonoscopy 6 MO AGO  H/O abdominal hysterectomy 2008    Allergies  latex (Hives)  No Known Drug Allergies    Home Medications:  aspirin 81 mg oral tablet: 1 tab(s) orally once a day (06 Apr 2018 11:09)  Crestor 10 mg oral tablet: 1 tab(s) orally once a day (at bedtime) (06 Apr 2018 11:09)  LORazepam 0.5 mg oral tablet: 1 tab(s) orally 2 times a day, As Needed (06 Apr 2018 11:09)  metoprolol tartrate 25 mg oral tablet:  (06 Apr 2018 11:09)  Synthroid 25 mcg (0.025 mg) oral tablet: 1 tab(s) orally once a day (06 Apr 2018 11:09)  Vitamin D3 1000 intl units oral tablet: 1 tab(s) orally once a day (06 Apr 2018 11:09)      ROS: 10-system review is otherwise negative except HPI above.      Primary Survey:    A - airway intact  B - bilateral breath sounds and good chest rise  C - palpable pulses in all extremities  D - GCS 15 on arrival, SCOTT  Exposure obtained    Vital Signs Last 24 Hrs  T(C): 36.4 (24 Feb 2021 08:05), Max: 36.4 (24 Feb 2021 07:56)  T(F): 97.5 (24 Feb 2021 08:05), Max: 97.5 (24 Feb 2021 07:56)  HR: 81 (24 Feb 2021 09:14) (81 - 88)  BP: 166/75 (24 Feb 2021 09:14) (166/75 - 182/89)  RR: 21 (24 Feb 2021 09:14) (17 - 21)  SpO2: 100% (24 Feb 2021 09:14) (99% - 100%)    Secondary Survey:   General: NAD  HEENT: Normocephalic, atraumatic, EOMI, PEERLA. no scalp lacerations, bilateral upper eye lid hematomas   Neck: Soft, midline trachea. no cspine tenderness  Chest: No chest wall tenderness. or subq  emphysema   Cardiac: S1, S2, RRR  Respiratory: Bilateral breath sounds, clear and equal bilaterally  Abdomen: Soft, non-distended, non-tender, no rebound,   Groin: Normal appearing, pelvis stable   Ext: palp radial b/l UE, b/l DP palp in Lower Extrem.   Back: no TTP, no palpable runoff/stepoff/deformity    LABS:                     12.6   8.70  )-----------( 171      ( 24 Feb 2021 08:03 )             38.1       02-24    142  |  104  |  12  ----------------------------<  152<H>  3.3<L>   |  23  |  0.8    Calcium, Total Serum: 9.2 mg/dL (02-24-21 @ 08:03)    LFTs:             7.0  | 0.5  | 37       ------------------[100     ( 24 Feb 2021 08:03 )  4.4  | x    | 20          Lipase:35    Blood Gas Venous - Lactate: 2.5 mmoL/L (02-24-21 @ 08:22)  Lactate, Blood: 2.6 mmol/L (02-24-21 @ 08:03)    Coags:     12.50  ----< 1.09    ( 24 Feb 2021 08:03 )     32.0      CARDIAC MARKERS ( 24 Feb 2021 08:03 )  x     / <0.01 ng/mL / x     / x     / x        Alcohol, Blood: <10 mg/dL (02-24-21 @ 08:03)  Alcohol, Blood: <10 mg/dL (02-24-21 @ 08:03)    RADIOLOGY & ADDITIONAL STUDIES:  < from: Xray Pelvis AP only (02.24.21 @ 08:44) >  Impression:  No evidence of acute fracture. Osteopenia.  < end of copied text >  ---------------------------------------------------------------------------------------    ASSESSMENT:  81y old f s/p    PLAN:    - pan scan   - CXR, PXR  - Trauma labs  - d/w Dr. Lynch

## 2021-02-24 NOTE — H&P ADULT - ASSESSMENT
81F s/p witnessed fall and acute decompensation in the ED found to have numerous SAH, IPH, and SDH along with superior and lateral orbital wall fractures. Now intubated     Plan:   Neurosurgery recommends platelets, keppra, and repeat CTH  F/u OMFS recs  ICU consult   NPO  IVF  Holding home ASA and DVT prophylaxis till cleared from Nsx  D/w Dr. Lynch

## 2021-02-24 NOTE — CONSULT NOTE ADULT - ASSESSMENT
OMFS Consult Note    81F s/p witnessed fall on ice hitting back of head. + LOC, BIBEMS. GCS 15 on arrival to ED however acutely decompensated and was found to have numerous SAH, IPH, and SDH. In addition was found to have bilateral superior orbital wall fractures and OMFS consulted for evaluation. Patient intubated and sedated in ED.    PAST MEDICAL & SURGICAL HISTORY:  Hypothyroidism    OA (osteoarthritis)    Anxiety    GERD (gastroesophageal reflux disease)    Hypercholesteremia    H/O colonoscopy  6 MO AGO    H/O abdominal hysterectomy  2008    Allergies    latex (Hives)  No Known Drug Allergies    Intolerances    PE:    ICU Vital Signs Last 24 Hrs  T(C): 35.3 (24 Feb 2021 13:00), Max: 36.4 (24 Feb 2021 07:56)  T(F): 95.6 (24 Feb 2021 13:00), Max: 97.5 (24 Feb 2021 07:56)  HR: 118 (24 Feb 2021 16:00) (81 - 127)  BP: 185/63 (24 Feb 2021 10:17) (166/75 - 185/63)  BP(mean): --  ABP: 147/53 (24 Feb 2021 16:00) (132/50 - 167/67)  ABP(mean): 78 (24 Feb 2021 14:00) (78 - 78)  RR: 16 (24 Feb 2021 16:00) (16 - 21)  SpO2: 100% (24 Feb 2021 16:00) (99% - 100%)    GEN - intubated and sedated, resting in bed  HEAD - posterior scalp contusion, no lesions or lacerations  EYES - bilateral periorbital ecchymosis (raccoon eyes), bilateral periorbital edema w/ L>R, unable to assess EOM 2/2 to sedation, PERRL, left subconjunctival hemorrhaging and chemosis, no bony steps of orbital rims  EARS - no drainage, no chavez signs  NOSE - symmetric, no mobility or crepitus, no septal hematomas, no rhinorrhea, no epistaxis  FACE - unable to assess CN V and VII function due to sedation, no lesions or lacerations, no swelling  MOUTH - ET tube secure and in place, no mobility of maxilla/mandible, dentition stable  NECK - C collar in place, soft    02-24    136  |  101  |  12  ----------------------------<  296<H>  4.4   |  16<L>  |  0.8    Ca    9.2      24 Feb 2021 16:39  Phos  3.3     02-24  Mg     1.8     02-24    TPro  7.0  /  Alb  4.4  /  TBili  0.5  /  DBili  x   /  AST  37  /  ALT  20  /  AlkPhos  100  02-24    WBC Count : 14.69 K/uL  RBC Count : 3.94 M/uL  Hemoglobin : 11.9 g/dL  Hematocrit : 36.9 %  Platelet Count - Automated : 198 K/uL  Mean Cell Volume : 93.7 fL  Mean Cell Hemoglobin : 30.2 pg  Mean Cell Hemoglobin Concentration : 32.2 g/dL  Auto Neutrophil # : x  Auto Lymphocyte # : x  Auto Monocyte # : x  Auto Eosinophil # : x  Auto Basophil # : x  Auto Neutrophil % : x  Auto Lymphocyte % : x  Auto Monocyte % : x  Auto Eosinophil % : x  Auto Basophil % : x    CT FACIAL BONES:  Nondisplaced fracture lines involving the bilateral superior orbital walls and left lateral orbital wall.  Posttraumatic fluid lining the bilateral supraorbital wall and the left lateral orbital wall in the extraconal space subjacent to the nondisplaced fractures. Mild left-sided asymmetric proptosis  Patchy stranding in the left intraconal space posterior to the globe, nonspecific, could reflect retrobulbar hemorrhage but may also be seen with globe rupture. Ophthalmologic evaluation is recommended.      A/P: 81F s/p fall on ice w/ blunt trauma to posterior head sustaining bilateral non-displaced superior orbital wall fractures and non-displaced left lateral orbital wall fracture. No direct trauma to face.    No acute surgical interventions indicated per OMFS.    -Ophtho following for intraocular pressures  -pain mgmt per primary team  -no indication for abx for superior orbital fractures  -cold compresses to affected areas  -please call [phone] with any questions or concerns, thank you    Reid Perez DMD, MD

## 2021-02-24 NOTE — ED PROCEDURE NOTE - CPROC ED INFORMED CONSENT1
This was an emergent procedure and consent was implied.
Benefits, risks, and possible complications of procedure explained to patient/caregiver who verbalized understanding and gave verbal consent.
This was an emergent procedure and consent was implied.
This was an emergent procedure and consent was implied.

## 2021-02-24 NOTE — CONSULT NOTE ADULT - SUBJECTIVE AND OBJECTIVE BOX
SICU Consultation Note  ======================================================================================================  LAKESHIA SANCHEZ  MRN-[id number]    80y/o Female, w/ PMH of anxiety, HLD, GERD, BIBEMS s/p witnessed fall. As per trauma team, patient was seen by bystander to have slipped on ice, falling and hitting the back of her head. +LOC, +AC (ASA), +HT. On arrival, patient's GCS 15, quickly deteriorated, unresponsive with GCS <4 while getting PANSCAN - patient intubated in ED, sedated on Propofol; HTN to 180s, started on Nicardipine gtt.  PANSCAN with the following significant findings:    < from: CT Head No Cont (21 @ 09:23) >  IMPRESSION:    CT HEAD:  Large acute intraparenchymal hematoma/contusion in the inferior bifrontal lobes (left larger than right), and in the anterior frontal parietal lobe with moderate mass effect and surrounding edema.    Small scattered acute subarachnoid hemorrhages along the bifrontal sulci, anterior interhemispheric fissure, right frontoparietal sulci, and in the interpeduncular cistern.    Small acute subdural hemorrhages along the left frontal convexity and along the falx.    Nondisplaced right parietal calvarial fracture extending to the right coronal suture with overlying right parietal scalp hematoma.    CT FACIAL BONES:  Nondisplaced fracture lines involving the bilateral superior orbital walls and left lateral orbital wall.    Posttraumatic fluid lining the bilateral supraorbital wall and the left lateral orbital wall in the extraconal space subjacent to the nondisplaced fractures. Mild left-sided asymmetric proptosis    Patchy stranding in the left intraconal space posterior to the globe, nonspecific, could reflect retrobulbar hemorrhage but may also be seen with globe rupture. Ophthalmologic evaluation is recommended.      CT CERVICAL SPINE:  No acute cervical fracture or dislocation.    Moderate degenerative changes of the cervical spine.    < end of copied text >        < from: CT Chest w/ IV Cont (21 @ 09:32) >  IMPRESSION:    1.  Apparent mural thickening of the transverse and left colon may be on the basis of underdistention or represent colitis. Correlate clinically.  2.  Nonspecific small pelvic ascites.  3.  Otherwise no specific evidence for acute traumatic injury in the chest abdomen or pelvis.  4.  4 mm left apical lung nodule. 12 month follow-up can be obtained. In low-risk patients no routine follow-up needed. In high-risk patients chest CT can beperformed in 12 months.  5.  See body of the report for additional findings.    < end of copied text >      Consults:  -Neurosurgery: q1h neurochecks, load Keppra 1g, continue with 500BID, rpt CTH in 6 hours (3pm), 1 platelet for ASA reversal, no mannitol  -Ophtho: concern for globe rupture --> occular pressures decreased (L:6, R:12), evaluated by Ophtho (L: 26, R: 17) - no acute intervention at this time, will continue to follow  -[facility] [place]    SICU consulted for neurologic and respiratory monitoring.            PAST MEDICAL & SURGICAL HISTORY:  Hypothyroidism    OA (osteoarthritis)    Anxiety    GERD (gastroesophageal reflux disease)    Hypercholesteremia    H/O colonoscopy  6 MO AGO    H/O abdominal hysterectomy          Home Meds:   Home Medications:  aspirin 81 mg oral tablet: 1 tab(s) orally once a day (2018 11:09)  Crestor 10 mg oral tablet: 1 tab(s) orally once a day (at bedtime) (2018 11:09)  LORazepam 0.5 mg oral tablet: 1 tab(s) orally 2 times a day, As Needed (2018 11:09)  metoprolol tartrate 25 mg oral tablet:  (2018 11:09)  Synthroid 25 mcg (0.025 mg) oral tablet: 1 tab(s) orally once a day (2018 11:09)  Vitamin D3 1000 intl units oral tablet: 1 tab(s) orally once a day (2018 11:09)      Allergies    latex (Hives)  No Known Drug Allergies    Intolerances          Advanced Directives: Full Code      CURRENT MEDICATIONS:   levETIRAcetam  IVPB 500 milliGRAM(s) IV Intermittent every 12 hours  levothyroxine Injectable 12.5 MICROGram(s) IV Push at bedtime  pantoprazole  Injectable 40 milliGRAM(s) IV Push daily  potassium chloride  20 mEq/100 mL IVPB 20 milliEquivalent(s) IV Intermittent every 2 hours  sodium chloride 0.9%. 1000 milliLiter(s) (100 mL/Hr) IV Continuous <Continuous>            VITAL SIGNS, INS/OUTS (Last 24hours):    ICU Vital Signs Last 24 Hrs  T(C): 36.4 (2021 10:17), Max: 36.4 (2021 07:56)  T(F): 97.5 (2021 10:17), Max: 97.5 (2021 07:56)  HR: 118 (2021 11:07) (81 - 127)  BP: 185/63 (2021 10:17) (166/75 - 185/63)  BP(mean): --  ABP: 135/54 (2021 11:07) (135/54 - 167/67)  ABP(mean): --  RR: 17 (2021 11:07) (17 - 21)  SpO2: 100% (2021 11:07) (99% - 100%)    I&O's Summary        Height (cm): 152.4 (21)  Weight (kg): 49.9 (21)  BMI (kg/m2): 21.5 (21)  BSA (m2): 1.45 (21)    Physical Exam:  ---------------------------------------------------------------------------------------  RASS:   GCS:    E/M/V  Exam: A&Ox3, no focal deficits    RESPIRATORY:  Intubated/Tracheostomy  Lungs clear to auscultation b/l, Normal expansion/effort  Mechanical Ventilation:     CARDIOVASCULAR:   S1/S2.  RRR  No peripheral edema    GASTROINTESTINAL:  Abdomen soft, non-tender, non-distended, no wounds or discoloration  Colostomy/Ileostomy/NG/OG tube in place    MUSCULOSKELETAL:  Extremities warm, pink, well-perfused.  Palpable/Doppler pulses signals      DERM:  No skin breakdown     :   Exam: Griggs catheter in place.       Tubes/Lines/Drains   ----------------------------------------------------------------------------------------------------------  [x] Peripheral IV  [] Central Venous Line    R/L        IJ/Femoral             Date Placed:    [] Arterial Line		   R/L         Radial/Femoral    Date Placed:   [] PICC:         	[] Midline		                                  Date Placed:   [] Urinary Catheter Griggs                                             Date Placed:       LABS  --------------------------------------------------------------------------------------  LFTs  LIVER FUNCTIONS - ( 2021 08:03 )  Alb: 4.4 g/dL / Pro: 7.0 g/dL / ALK PHOS: 100 U/L / ALT: 20 U/L / AST: 37 U/L / GGT: x             Cardiac Markers  CARDIAC MARKERS ( 2021 08:03 )  x     / <0.01 ng/mL / x     / x     / x            Coagulation  PT/INR - ( 2021 08:03 )   PT: 12.50 sec;   INR: 1.09 ratio         PTT - ( 2021 08:03 )  PTT:32.0 sec    Arterial Blood Gas      Blood Sugar  CAPILLARY BLOOD GLUCOSE          Urinalysis  Urinalysis Basic - ( 2021 08:03 )    Color: Colorless / Appearance: Clear / S.022 / pH: x  Gluc: x / Ketone: Small  / Bili: Negative / Urobili: <2 mg/dL   Blood: x / Protein: Trace / Nitrite: Negative   Leuk Esterase: Negative / RBC: x / WBC x   Sq Epi: x / Non Sq Epi: x / Bacteria: x              --------------------------------------------------------------------------------------  Admit Diagnosis: INTRACRANIAL BLED ORBITAL WALLFX GLOBE PTOSIS          SICU Consultation Note  ======================================================================================================  LAKESHIA SANCHEZ  MRN-[id number]    82y/o Female, w/ PMH of anxiety, HLD, GERD, BIBEMS s/p witnessed fall. As per trauma team, patient was seen by bystander to have slipped on ice, falling and hitting the back of her head. +LOC, +AC (ASA), +HT. On arrival, patient's GCS 15, quickly deteriorated, unresponsive with GCS <4 while getting PANSCAN - patient intubated in ED, sedated on Propofol; HTN to 180s, started on Nicardipine gtt.  PANSCAN with the following significant findings:    < from: CT Head No Cont (21 @ 09:23) >  IMPRESSION:    CT HEAD:  Large acute intraparenchymal hematoma/contusion in the inferior bifrontal lobes (left larger than right), and in the anterior frontal parietal lobe with moderate mass effect and surrounding edema.    Small scattered acute subarachnoid hemorrhages along the bifrontal sulci, anterior interhemispheric fissure, right frontoparietal sulci, and in the interpeduncular cistern.    Small acute subdural hemorrhages along the left frontal convexity and along the falx.    Nondisplaced right parietal calvarial fracture extending to the right coronal suture with overlying right parietal scalp hematoma.    CT FACIAL BONES:  Nondisplaced fracture lines involving the bilateral superior orbital walls and left lateral orbital wall.    Posttraumatic fluid lining the bilateral supraorbital wall and the left lateral orbital wall in the extraconal space subjacent to the nondisplaced fractures. Mild left-sided asymmetric proptosis    Patchy stranding in the left intraconal space posterior to the globe, nonspecific, could reflect retrobulbar hemorrhage but may also be seen with globe rupture. Ophthalmologic evaluation is recommended.      CT CERVICAL SPINE:  No acute cervical fracture or dislocation.    Moderate degenerative changes of the cervical spine.    < end of copied text >        < from: CT Chest w/ IV Cont (21 @ 09:32) >  IMPRESSION:    1.  Apparent mural thickening of the transverse and left colon may be on the basis of underdistention or represent colitis. Correlate clinically.  2.  Nonspecific small pelvic ascites.  3.  Otherwise no specific evidence for acute traumatic injury in the chest abdomen or pelvis.  4.  4 mm left apical lung nodule. 12 month follow-up can be obtained. In low-risk patients no routine follow-up needed. In high-risk patients chest CT can beperformed in 12 months.  5.  See body of the report for additional findings.    < end of copied text >      Consults:  -Neurosurgery: q1h neurochecks, load Keppra 1g, continue with 500BID, rpt CTH in 6 hours (3pm), 1 platelet for ASA reversal, no mannitol  -Ophtho: concern for globe rupture --> occular pressures decreased (L:6, R:12), evaluated by Ophtho (L: 26, R: 17) - no acute intervention at this time, will continue to follow  -[facility] [place]    SICU consulted for neurologic and respiratory monitoring.            PAST MEDICAL & SURGICAL HISTORY:  Hypothyroidism    OA (osteoarthritis)    Anxiety    GERD (gastroesophageal reflux disease)    Hypercholesteremia    H/O colonoscopy  6 MO AGO    H/O abdominal hysterectomy          Home Meds:   Home Medications:  aspirin 81 mg oral tablet: 1 tab(s) orally once a day (2018 11:09)  Crestor 10 mg oral tablet: 1 tab(s) orally once a day (at bedtime) (2018 11:09)  LORazepam 0.5 mg oral tablet: 1 tab(s) orally 2 times a day, As Needed (2018 11:09)  metoprolol tartrate 25 mg oral tablet:  (2018 11:09)  Synthroid 25 mcg (0.025 mg) oral tablet: 1 tab(s) orally once a day (2018 11:09)  Vitamin D3 1000 intl units oral tablet: 1 tab(s) orally once a day (2018 11:09)      Allergies    latex (Hives)  No Known Drug Allergies    Intolerances          Advanced Directives: Full Code      CURRENT MEDICATIONS:   levETIRAcetam  IVPB 500 milliGRAM(s) IV Intermittent every 12 hours  levothyroxine Injectable 12.5 MICROGram(s) IV Push at bedtime  pantoprazole  Injectable 40 milliGRAM(s) IV Push daily  potassium chloride  20 mEq/100 mL IVPB 20 milliEquivalent(s) IV Intermittent every 2 hours  sodium chloride 0.9%. 1000 milliLiter(s) (100 mL/Hr) IV Continuous <Continuous>            VITAL SIGNS, INS/OUTS (Last 24hours):    ICU Vital Signs Last 24 Hrs  T(C): 36.4 (2021 10:17), Max: 36.4 (2021 07:56)  T(F): 97.5 (2021 10:17), Max: 97.5 (2021 07:56)  HR: 118 (2021 11:07) (81 - 127)  BP: 185/63 (2021 10:17) (166/75 - 185/63)  BP(mean): --  ABP: 135/54 (2021 11:07) (135/54 - 167/67)  ABP(mean): --  RR: 17 (2021 11:07) (17 - 21)  SpO2: 100% (2021 11:07) (99% - 100%)    I&O's Summary        Height (cm): 152.4 (21)  Weight (kg): 49.9 (21)  BMI (kg/m2): 21.5 (21)  BSA (m2): 1.45 (21)    Physical Exam:  ---------------------------------------------------------------------------------------  RASS: -4  GCS:   4T  Exam: unable to assess L pupil, L eye w/ edema, ecchymosis of b/l eyelids, R pupil 4mm reactive; +gag    RESPIRATORY:  Intubated  Lungs clear to auscultation b/l, Normal expansion/effort  Mechanical Ventilation: 380/16/100/5    CARDIOVASCULAR:   S1/S2.  RRR  No peripheral edema    GASTROINTESTINAL:  Abdomen soft, non-tender, non-distended, no wounds or discoloration  OGT to [facility]    MUSCULOSKELETAL:  Extremities warm, pink, well-perfused.  Palpable b/l DP, PT     DERM:  No skin breakdown     :   Exam: Griggs catheter in place.       Tubes/Lines/Drains   ----------------------------------------------------------------------------------------------------------  [x] Peripheral IV  [] Arterial Line		   R/L         Radial/Femoral    Date Placed:   [] Urinary Catheter Griggs                                             Date Placed:   ETT  OGT      LABS  --------------------------------------------------------------------------------------  LFTs  LIVER FUNCTIONS - ( 2021 08:03 )  Alb: 4.4 g/dL / Pro: 7.0 g/dL / ALK PHOS: 100 U/L / ALT: 20 U/L / AST: 37 U/L / GGT: x             Cardiac Markers  CARDIAC MARKERS ( 2021 08:03 )  x     / <0.01 ng/mL / x     / x     / x            Coagulation  PT/INR - ( 2021 08:03 )   PT: 12.50 sec;   INR: 1.09 ratio         PTT - ( 2021 08:03 )  PTT:32.0 sec    Arterial Blood Gas      Blood Sugar  CAPILLARY BLOOD GLUCOSE          Urinalysis  Urinalysis Basic - ( 2021 08:03 )    Color: Colorless / Appearance: Clear / S.022 / pH: x  Gluc: x / Ketone: Small  / Bili: Negative / Urobili: <2 mg/dL   Blood: x / Protein: Trace / Nitrite: Negative   Leuk Esterase: Negative / RBC: x / WBC x   Sq Epi: x / Non Sq Epi: x / Bacteria: x              --------------------------------------------------------------------------------------  Admit Diagnosis: INTRACRANIAL BLED ORBITAL WALLFX GLOBE PTOSIS

## 2021-02-24 NOTE — ED PROVIDER NOTE - OBJECTIVE STATEMENT
82 yo female, pmh of anxiety on ativan, on asa, presents to ed for fall. As per ems + LOC, pt unaware of how fall occurred, has bruising to b/l orbits. Pt without specific pain or radiation; pt states + nausea. Denies neck pain, back pain, when eyes are open no blurry vision or visual field deficits, joint pain, cp, sob, abd pain, vomiting.

## 2021-02-24 NOTE — ED PROVIDER NOTE - NS ED ATTENDING STATEMENT MOD
Attending with I have personally provided the amount of critical care time documented below excluding time spent on separate procedures.

## 2021-02-24 NOTE — H&P ADULT - NSICDXPASTMEDICALHX_GEN_ALL_CORE_FT
PAST MEDICAL HISTORY:  Anxiety     GERD (gastroesophageal reflux disease)     Hypercholesteremia     Hypothyroidism     OA (osteoarthritis)

## 2021-02-25 NOTE — CONSULT NOTE ADULT - CONSULT REASON
Alert
b/l intraparenchymal hemorrhages
q1h neurochecks, respiratory monitoring s/p fall
Rule out Compartment Syndrome OS
Goals of care and symptom management
bilateral superior orbital wall fractures s/p fall
Rule out compartment syndrome
Gastroenterology at [facility]  Gastroenterology  [address] [place] [address]  Phone: [phone]  Fax:   Follow Up Time:     [facility]  Gastroenterology  [address] [address]  Phone: [phone]  Fax:   Follow Up Time:     [facility]  Gastroenterology  [address] [place] [address]  Phone: [phone]  Fax: [phone]  Follow Up Time:

## 2021-02-25 NOTE — PROGRESS NOTE ADULT - SUBJECTIVE AND OBJECTIVE BOX
Subjective: s/p splip and fall on ice with multiple large expanding bifrontal and right anterior parietal IPG. Intubated. Prognosis poor. No indication for intervention given exceedingly poor prognosis due to extensive catastrophic hemorrhage.     T(C): 36.7 (02-25-21 @ 13:00), Max: 38.3 (02-25-21 @ 06:00)  HR: 55 (02-25-21 @ 14:00) (52 - 118)  BP: --  RR: 16 (02-25-21 @ 14:00) (16 - 16)  SpO2: 100% (02-25-21 @ 14:00) (100% - 100%)  Wt(kg): --    Exam: intubated  pupils minimally reactive  non specific movement BUE with noxious  TF BLE    CBC Full  -  ( 25 Feb 2021 00:37 )  WBC Count : 9.44 K/uL  RBC Count : 3.93 M/uL  Hemoglobin : 12.1 g/dL  Hematocrit : 35.9 %  Platelet Count - Automated : 156 K/uL  Mean Cell Volume : 91.3 fL  Mean Cell Hemoglobin : 30.8 pg  Mean Cell Hemoglobin Concentration : 33.7 g/dL  Auto Neutrophil # : x  Auto Lymphocyte # : x  Auto Monocyte # : x  Auto Eosinophil # : x  Auto Basophil # : x  Auto Neutrophil % : x  Auto Lymphocyte % : x  Auto Monocyte % : x  Auto Eosinophil % : x  Auto Basophil % : x    02-25    138  |  105  |  12  ----------------------------<  150<H>  4.4   |  19  |  0.7    Ca    9.3      25 Feb 2021 00:37  Phos  2.3     02-25  Mg     1.8     02-25    TPro  7.0  /  Alb  4.4  /  TBili  0.5  /  DBili  x   /  AST  37  /  ALT  20  /  AlkPhos  100  02-24    PT/INR - ( 25 Feb 2021 00:37 )   PT: 13.60 sec;   INR: 1.18 ratio         PTT - ( 25 Feb 2021 00:37 )  PTT:28.0 sec        Assessment/Plan: 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 Please reconsult as needed. Pt poor prognosis discussed at length by Dr. Thierry with family this AM. They plan to request DNR.

## 2021-02-25 NOTE — CONSULT NOTE ADULT - ASSESSMENT
Consult Summary      Morphine Equivalent Daily Dose (MEDD):     Recommendations:  DNR - spoke to daughter Jelena/HCP will follow up when she visits       Please Call [phone] PRN Consult Summary: Pt is an 81 yr old female s/p fall on ice. Brought in alert, became unresponsive after HCT, pt intubated and is in crit area of ER. Pt has SAH bilateral hemorrhages not a neuosurgical candiate (+) grave prognosis. Daughter Jelena is her HCP (form printed and in chart) She is coming from [place], will meet with palliative team on arrival to discuss option of palliative removal of life support. Living will in chart states she does not want life sustaining measures in the even she is unresponsive and cannot make a functional recovery       Morphine Equivalent Daily Dose (MEDD): 0    Recommendations:  DNR - spoke to daughter Jelena/HCP will follow up when she visits   [facility]  Palliative care and Trauma follow up      Please Call [phone] PRN Consult Summary: Pt is an 81 yr old female s/p fall on ice. Brought in alert, became unresponsive after HCT, pt intubated and is in crit area of ER. Pt has SAH bilateral hemorrhages not a neuosurgical candiate (+) grave prognosis. Daughter Jelena is her HCP (form printed and in chart) She is coming from [place], will meet with palliative team on arrival to discuss option of palliative removal of life support. Living will in chart states she does not want life sustaining measures in the even she is unresponsive and cannot make a functional recovery     Morphine Equivalent Daily Dose (MEDD): 0    Recommendations:  DNR - spoke to daughter Jelena/HCP will follow up when she visits   [facility]  Palliative care and Trauma follow up      Please Call [phone] PRN

## 2021-02-25 NOTE — PROGRESS NOTE ADULT - SUBJECTIVE AND OBJECTIVE BOX
LAKESHIA SANCHEZ  [id number]  81y Female    Indication for ICU admission: q1h neurochecks & respiratory monitoring, s/p witnessed fall, +HT, +LOC, +AC (ASA)    Admit Date:02-24-21  ICU Date: 2/24/21  OR Date: N/A    latex (Hives)  No Known Drug Allergies    PAST MEDICAL & SURGICAL HISTORY:  Hypothyroidism  OA (osteoarthritis)  Anxiety  GERD (gastroesophageal reflux disease)  Hypercholesteremia  H/O colonoscopy 6 MO AGO  H/O abdominal hysterectomy 2008      Home Medications:  aspirin 81 mg oral tablet: 1 tab(s) orally once a day (06 Apr 2018 11:09)  Crestor 10 mg oral tablet: 1 tab(s) orally once a day (at bedtime) (06 Apr 2018 11:09)  LORazepam 0.5 mg oral tablet: 1 tab(s) orally 2 times a day, As Needed (06 Apr 2018 11:09)  metoprolol tartrate 25 mg oral tablet:  (06 Apr 2018 11:09)  Synthroid 25 mcg (0.025 mg) oral tablet: 1 tab(s) orally once a day (06 Apr 2018 11:09)  Vitamin D3 1000 intl units oral tablet: 1 tab(s) orally once a day (06 Apr 2018 11:09)        24HRS EVENT:  Night  -Trop down to .88          DVT PTX: holding as per nxs    GI PTX:pantoprazole  Injectable 40 milliGRAM(s) IV Push daily      ***Tubes/Lines/Drains  ***  Peripheral IV  Arterial Line  Urinary Catheter		Indication: Strict I&O    Date Placed:   ETT  OGT    REVIEW OF SYSTEMS    [ ] A ten-point review of systems was otherwise negative except as noted.  [x ] Due to altered mental status/intubation, subjective information were not able to be obtained from the patient. History was obtained, to the extent possible, from review of the chart and collateral sources of information.

## 2021-02-25 NOTE — CONSULT NOTE ADULT - CONSULT REQUESTED DATE/TIME
24-Feb-2021 09:26
24-Feb-2021 09:50
24-Feb-2021 10:30
24-Feb-2021 12:46
24-Feb-2021 18:00
25-Feb-2021
25-Feb-2021 11:40

## 2021-02-25 NOTE — PROGRESS NOTE ADULT - ASSESSMENT
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 L retrobulbar hemorrhage vs globe rupture       NEURO:   Sedation - weaned off Propofol, started on low-dose Precedex  -maintain RASS 0 to -1  Pain - controlled with Fent prn  Nsx c/s: q1h neurochecks, load Keppra 1g, continue with 500BID, , 1 platelet for ASA reversal, no mannitol  -rpt CTH: significant enlargement in IPHs with extension in L lateral ventricle, increased mass effect (1cm shift to the R), impending L uncal herniation; increased diffuse SAHs; enlarged SDH now extending along the lateral L middle cranial fossa  -CTA: no large vessel occlusion/stenosis/vascular malformation; + 0.4 x 0.3cm saccular aneurysms along the medial aspect of the b/l ICAs remote from site of large IPH  Optho following - globe intact, no rupture, no concern for hemorrhage - started on artificial tears ointment  OMFS c/s pending  Hx of anxiety - holding home Lorazepam while sedated    RESP:   Intubated for airway protection   Vent: 380/16/40/5  AM ABG  AM CXR    CARDS:   Acute HTN - on Cleviprex gtt  -maintain SBP < 160, MAP > 65  Hx of HLD - on home statin  trop neg x1, 1.35 > .88  NM Stress test (1/2020): EF > 55%, negative stress test  EKG: NSR, QTc 486    GI/NUTR:   NPO  OGT to LCS  PPI  Senna    /RENAL:   Griggs - strict I&O  NS @ 100cc/hr  BUN/Cr 12/0.8> 12/.7  Lactic acidosis - lactate 2.6 - continue to trend  UA neg (+glucose, +ketones)  Monitor & replete elytes prn    HEME/ONC:   H/H 12/38   DVT ppx: holding as per neurosurgery  holding home ASA  -reversed w/ 1u platelets as per neurosurgery request    ID:   Afebrile  WBC 14>9  No abx at this time  COVID neg  UA neg    ENDO:  Hx of hypothyroidism - on home Synthroid  FS q4h while NPO - ISS if needed, A1C pending      LINES/DRAINS: ETT, OGT, R Anson Rider, PIV    DISPO: SICU

## 2021-02-25 NOTE — GOALS OF CARE CONVERSATION - ADVANCED CARE PLANNING - TREATMENT GUIDELINE COMMENT
DNR, no escalation of care.  plan for Palliative extubation tomorrow.  Palliative will remain available.  recommendations in chart for comfort.  x [phone] DNR/DNI, palliative extubation today, comfort measures only, no labs, no artificial nutrition, no IV fluids.  Palliative will make recommendations for comfort.  [phone]

## 2021-02-25 NOTE — CONSULT NOTE ADULT - SUBJECTIVE AND OBJECTIVE BOX
REQUESTED OF: DR Warren    Chart reviewed, Hospital Day 1    LAKESHIA SANCHEZ 81yFemale  HPI:  81y old f s/p witnessed fall on ice this morning +HT, +LOC,+ASA. She was seen walking on the ice and fell backwards hitting the backof her head. She does not remember the fall but presents with GCS of 15 and bilateral upper eyelid hematomas. She was not complaining of any pain. However after she returned from CT she became unresponsive and required intubation. An arterial line was placed and she was started on a cardizem gtt for tachycardia. ICU and Neurosurgery were consulted  (24 Feb 2021 11:24)        PAST MEDICAL & SURGICAL HISTORY:  Hypothyroidism    OA (osteoarthritis)    Anxiety    GERD (gastroesophageal reflux disease)    Hypercholesteremia    H/O colonoscopy  6 MO AGO    H/O abdominal hysterectomy  2008        Subjective and Objective:  Today,  Discussed with Trauma team & ER nurse     Focused Palliative Care Evaluation:                   Symptoms:                                      Pain yes                                      Dyspnea none                                                    Support Devices: Vent/OG tube             PHYSICAL EXAM:      Constitutional:    Respiratory:    Cardiovascular:    Gastrointestinal:    Genitourinary:    Rectal:    Extremities:    Vascular:    Neurological:    Skin:    Lymph Nodes:    Musculoskeletal:    Psychiatric:        T(C): 36.7, Max: 38.3 (06:00)  HR: 56 (52 - 118)  BP: --  RR: 16 (16 - 16)  SpO2: 100% (100% - 100%)      LABS/STUDIES:  02-25    138  |  105  |  12  ----------------------------<  150<H>  4.4   |  19  |  0.7    Ca    9.3      25 Feb 2021 00:37  Phos  2.3     02-25  Mg     1.8     02-25    TPro  7.0  /  Alb  4.4  /  TBili  0.5  /  DBili  x   /  AST  37  /  ALT  20  /  AlkPhos  100  02-24                            12.1   9.44  )-----------( 156      ( 25 Feb 2021 00:37 )             35.9       MEDICATIONS  (STANDING):  artificial tears (preservative free) Ophthalmic Solution 1 Drop(s) Both EYES every 6 hours  clevidipine Infusion 1 mG/Hr (2 mL/Hr) IV Continuous <Continuous>  dexMEDEtomidine Infusion 0.1 MICROgram(s)/kG/Hr (1.25 mL/Hr) IV Continuous <Continuous>  dextrose 40% Gel 15 Gram(s) Oral once  dextrose 5%. 1000 milliLiter(s) (50 mL/Hr) IV Continuous <Continuous>  dextrose 5%. 1000 milliLiter(s) (100 mL/Hr) IV Continuous <Continuous>  dextrose 50% Injectable 12.5 Gram(s) IV Push once  dextrose 50% Injectable 25 Gram(s) IV Push once  dextrose 50% Injectable 25 Gram(s) IV Push once  glucagon  Injectable 1 milliGRAM(s) IntraMuscular once  insulin regular  human corrective regimen sliding scale   IV Push every 4 hours  levETIRAcetam  IVPB 500 milliGRAM(s) IV Intermittent every 12 hours  levothyroxine Injectable 12.5 MICROGram(s) IV Push at bedtime  norepinephrine Infusion 0.01 MICROgram(s)/kG/Min (0.94 mL/Hr) IV Continuous <Continuous>  pantoprazole  Injectable 40 milliGRAM(s) IV Push daily  senna 2 Tablet(s) Oral at bedtime  sodium chloride 0.9%. 1000 milliLiter(s) (100 mL/Hr) IV Continuous <Continuous>    MEDICATIONS  (PRN):          iStop:         PPS  Level    __________       Note PPS = Palliative Performance Scale; (c)2001, [facility] Hospice Society       Range from 100% meaning Full ambulation/self-care/intake/Level of Consicous                                                                              to        10% meaning Bedbound/Unable to do any activity/extensive disease /Total Care/ No PO intake/ LOC=Full/drowsy/+/-confusion        (0% = death)                     Prior to acute illness, patient's functionality reportedly                                                                                                                                                                                    REQUESTED OF: DR Warren    Chart reviewed, Hospital Day 1    LAKESHIA SANCHEZ 81yFemale  HPI:  81y old f s/p witnessed fall on ice this morning +HT, +LOC,+ASA. She was seen walking on the ice and fell backwards hitting the backof her head. She does not remember the fall but presents with GCS of 15 and bilateral upper eyelid hematomas. She was not complaining of any pain. However after she returned from CT she became unresponsive and required intubation. An arterial line was placed and she was started on a cardizem gtt for tachycardia. ICU and Neurosurgery were consulted  (24 Feb 2021 11:24)        PAST MEDICAL & SURGICAL HISTORY:  Hypothyroidism    OA (osteoarthritis)    Anxiety    GERD (gastroesophageal reflux disease)    Hypercholesteremia    H/O colonoscopy  6 MO AGO    H/O abdominal hysterectomy  2008        Subjective and Objective:  Today,  Discussed with Trauma team & ER nurse     Focused Palliative Care Evaluation:                   Symptoms:                                      Pain yes                                      Dyspnea none                                                    Support Devices: Vent/OG tube             PHYSICAL EXAM:      Constitutional: pt appears as stated age, critically ill     Respiratory: no distress on vent, orally intubated    Cardiovascular: no edema     Gastrointestinal: soft NT     Genitourinary: sebastian to straight drainage     Extremities: (+) non purposeful movement     Neurological: withdraws to pain otherwise unresponsive     Skin: noted with purpura B/L orbits       T(C): 36.7, Max: 38.3 (06:00)  HR: 56 (52 - 118)  BP: --  RR: 16 (16 - 16)  SpO2: 100% (100% - 100%)      LABS/STUDIES:  02-25    138  |  105  |  12  ----------------------------<  150<H>  4.4   |  19  |  0.7    Ca    9.3      25 Feb 2021 00:37  Phos  2.3     02-25  Mg     1.8     02-25    TPro  7.0  /  Alb  4.4  /  TBili  0.5  /  DBili  x   /  AST  37  /  ALT  20  /  AlkPhos  100  02-24                            12.1   9.44  )-----------( 156      ( 25 Feb 2021 00:37 )             35.9       MEDICATIONS  (STANDING):  artificial tears (preservative free) Ophthalmic Solution 1 Drop(s) Both EYES every 6 hours  clevidipine Infusion 1 mG/Hr (2 mL/Hr) IV Continuous <Continuous>  dexMEDEtomidine Infusion 0.1 MICROgram(s)/kG/Hr (1.25 mL/Hr) IV Continuous <Continuous>  dextrose 40% Gel 15 Gram(s) Oral once  dextrose 5%. 1000 milliLiter(s) (50 mL/Hr) IV Continuous <Continuous>  dextrose 5%. 1000 milliLiter(s) (100 mL/Hr) IV Continuous <Continuous>  dextrose 50% Injectable 12.5 Gram(s) IV Push once  dextrose 50% Injectable 25 Gram(s) IV Push once  dextrose 50% Injectable 25 Gram(s) IV Push once  glucagon  Injectable 1 milliGRAM(s) IntraMuscular once  insulin regular  human corrective regimen sliding scale   IV Push every 4 hours  levETIRAcetam  IVPB 500 milliGRAM(s) IV Intermittent every 12 hours  levothyroxine Injectable 12.5 MICROGram(s) IV Push at bedtime  norepinephrine Infusion 0.01 MICROgram(s)/kG/Min (0.94 mL/Hr) IV Continuous <Continuous>  pantoprazole  Injectable 40 milliGRAM(s) IV Push daily  senna 2 Tablet(s) Oral at bedtime  sodium chloride 0.9%. 1000 milliLiter(s) (100 mL/Hr) IV Continuous <Continuous>    MEDICATIONS  (PRN):          iStop:     Patient Name: Lakeshia Enriquez Date: 1940  Address: [address] [place] [id number]: Female  Rx Written	Rx Dispensed	Drug	Quantity	Days Supply	Prescriber Name	Payment Method	Dispenser  01/28/2021	02/04/2021	lorazepam 0.5 mg tablet	90	30	Marcos Haney S, (DO)	Medicare	[id number]  11/25/2020	12/07/2020	lorazepam 0.5 mg tablet	90	30	Marcos Haney S, (DO)	Medicare	[facility][id number]  09/30/2020	10/03/2020	lorazepam 0.5 mg tablet	90	30	Marcos Haney S, (DO)	Medicare	[facility][id number]  07/22/2020	07/25/2020	lorazepam 0.5 mg tablet	90	30	Marcos Haney S, (DO)	Medicare	[facility][id number]  06/23/2020	06/26/2020	lorazepam 0.5 mg tablet	90	30	Marcos Haney S, (DO)	Medicare	[id number]  05/26/2020	05/26/2020	lorazepam 0.5 mg tablet	90	30	Marcos Haney S, (DO)	Medicare	[facility][id number]  04/28/2020	04/29/2020	lorazepam 0.5 mg tablet	90	30	Marcos Haney S, (DO)	Medicare	[facility][id number]  03/26/2020	03/26/2020	lorazepam 0.5 mg tablet	90	30	Marcos Haney S, (DO)	Medicare	[id number]  02/26/2020	02/26/2020	lorazepam 0.5 mg tablet	90	30	Marcos Haney S, (DO)	Medicare	[facility] #[id number]      PPS  Level   10%       Note PPS = Palliative Performance Scale; (c)2001, [facility] Hospice Society       Range from 100% meaning Full ambulation/self-care/intake/Level of Consicous                                                                              to        10% meaning Bedbound/Unable to do any activity/extensive disease /Total Care/ No PO intake/ LOC=Full/drowsy/+/-confusion        (0% = death)                     Prior to acute illness, patient's functionality reportedly independent lived at home                                                                                                                                                                                    REQUESTED OF: DR Warren    Chart reviewed, Hospital Day 1    LAKESHIA SANCHEZ 81yFemale  HPI:  81y old f s/p witnessed fall on ice this morning +HT, +LOC,+ASA. She was seen walking on the ice and fell backwards hitting the backof her head. She does not remember the fall but presents with GCS of 15 and bilateral upper eyelid hematomas. She was not complaining of any pain. However after she returned from CT she became unresponsive and required intubation. An arterial line was placed and she was started on a cardizem gtt for tachycardia. ICU and Neurosurgery were consulted  (24 Feb 2021 11:24)        PAST MEDICAL & SURGICAL HISTORY:  Hypothyroidism    OA (osteoarthritis)    Anxiety    GERD (gastroesophageal reflux disease)    Hypercholesteremia    H/O colonoscopy  6 MO AGO    H/O abdominal hysterectomy  2008        Subjective and Objective:  Today,  Discussed with Trauma team & ER nurse     Focused Palliative Care Evaluation:                   Symptoms:                                      Pain yes                                      Dyspnea none                                                    Support Devices: Vent/OG tube             PHYSICAL EXAM:      Constitutional: pt appears as stated age, critically ill, not participate in exam  Respiratory: no distress on vent, orally intubated  Cardiovascular: no edema   Gastrointestinal: soft NT   Genitourinary: sebastian to straight drainage   Extremities: (+) non purposeful movement   Neurological: withdraws to pain otherwise unresponsive   Skin: noted with purpura B/L orbits   Psych: unable to determine as sedated      T(C): 36.7, Max: 38.3 (06:00)  HR: 56 (52 - 118)  BP: --  RR: 16 (16 - 16)  SpO2: 100% (100% - 100%)      LABS/STUDIES:  02-25    138  |  105  |  12  ----------------------------<  150<H>  4.4   |  19  |  0.7    Ca    9.3      25 Feb 2021 00:37  Phos  2.3     02-25  Mg     1.8     02-25    TPro  7.0  /  Alb  4.4  /  TBili  0.5  /  DBili  x   /  AST  37  /  ALT  20  /  AlkPhos  100  02-24                            12.1   9.44  )-----------( 156      ( 25 Feb 2021 00:37 )             35.9       MEDICATIONS  (STANDING):  artificial tears (preservative free) Ophthalmic Solution 1 Drop(s) Both EYES every 6 hours  clevidipine Infusion 1 mG/Hr (2 mL/Hr) IV Continuous <Continuous>  dexMEDEtomidine Infusion 0.1 MICROgram(s)/kG/Hr (1.25 mL/Hr) IV Continuous <Continuous>  dextrose 40% Gel 15 Gram(s) Oral once  dextrose 5%. 1000 milliLiter(s) (50 mL/Hr) IV Continuous <Continuous>  dextrose 5%. 1000 milliLiter(s) (100 mL/Hr) IV Continuous <Continuous>  dextrose 50% Injectable 12.5 Gram(s) IV Push once  dextrose 50% Injectable 25 Gram(s) IV Push once  dextrose 50% Injectable 25 Gram(s) IV Push once  glucagon  Injectable 1 milliGRAM(s) IntraMuscular once  insulin regular  human corrective regimen sliding scale   IV Push every 4 hours  levETIRAcetam  IVPB 500 milliGRAM(s) IV Intermittent every 12 hours  levothyroxine Injectable 12.5 MICROGram(s) IV Push at bedtime  norepinephrine Infusion 0.01 MICROgram(s)/kG/Min (0.94 mL/Hr) IV Continuous <Continuous>  pantoprazole  Injectable 40 milliGRAM(s) IV Push daily  senna 2 Tablet(s) Oral at bedtime  sodium chloride 0.9%. 1000 milliLiter(s) (100 mL/Hr) IV Continuous <Continuous>    MEDICATIONS  (PRN):          iStop:     Patient Name: Lakeshia Enriquez Date: 1940  Address: [address] [place] [id number]: Female  Rx Written	Rx Dispensed	Drug	Quantity	Days Supply	Prescriber Name	Payment Method	Dispenser  01/28/2021	02/04/2021	lorazepam 0.5 mg tablet	90	30	Marcos Haney S, (DO)	Medicare	[id number]  11/25/2020	12/07/2020	lorazepam 0.5 mg tablet	90	30	Marcos Haney S, (DO)	Medicare	[facility][id number]  09/30/2020	10/03/2020	lorazepam 0.5 mg tablet	90	30	Marcos Haney S, (DO)	Medicare	[facility] #[id number]  07/22/2020	07/25/2020	lorazepam 0.5 mg tablet	90	30	Marcos Haney S, (DO)	Medicare	[facility] #[id number]  06/23/2020	06/26/2020	lorazepam 0.5 mg tablet	90	30	Marcos Haney, S, (DO)	Medicare	[facility] #[id number]  05/26/2020	05/26/2020	lorazepam 0.5 mg tablet	90	30	Marcos Haney, S, (DO)	Medicare	[id number]  04/28/2020	04/29/2020	lorazepam 0.5 mg tablet	90	30	Marcos Haney, S, (DO)	Medicare	[id number]  03/26/2020	03/26/2020	lorazepam 0.5 mg tablet	90	30	Marcos Haney S, (DO)	Medicare	[id number]  02/26/2020	02/26/2020	lorazepam 0.5 mg tablet	90	30	Marcos Haney, S, (DO)	Medicare	[facility] #[id number]      PPS  Level   10%       Note PPS = Palliative Performance Scale; (c)2001, [facility] Hospice Society       Range from 100% meaning Full ambulation/self-care/intake/Level of Consicous                                                                              to        10% meaning Bedbound/Unable to do any activity/extensive disease /Total Care/ No PO intake/ LOC=Full/drowsy/+/-confusion        (0% = death)                     Prior to acute illness, patient's functionality reportedly independent lived at home

## 2021-02-25 NOTE — GOALS OF CARE CONVERSATION - ADVANCED CARE PLANNING - CONVERSATION DETAILS
Spoke with daughter and son in law who just flew up from [place].  Pt s/p fall, multiple brain bleeds, facial fractures. No surgical intervention as per neuro surgery.   Pt's living will was clear, pt's  living will states patient does not want to be kept alive on life support.Prior to admission pt was independent, lived alone.  pt has two children.   Pt made DNR by daughter via telephone.  Plan is for no escalation of care, palliative extubation tomorrow.  family aware of Palliative teams availability. Spoke with daughter and son in law who just flew up from [place].  Pt s/p fall, multiple brain bleeds, facial fractures. No surgical intervention as per neuro surgery.   Pt's living will was clear, pt's  living will states patient does not want to be kept alive on life support.Prior to admission pt was independent, lived alone.  pt has two children.   Spoke to family this AM, family received an medical update, no changes with patient's condition.  Palliative extubation today as per family.  Palliative will make recommendations. .

## 2021-02-25 NOTE — CONSULT NOTE ADULT - SUBJECTIVE AND OBJECTIVE BOX
HPI:  81y old f s/p witnessed fall on ice this morning +HT, +LOC,+ASA. She was seen walking on the ice and fell backwards hitting the back of her head. She does not remember the fall but presents with GCS of 15 and bilateral upper eyelid hematomas.     PAST MEDICAL & SURGICAL HISTORY:  Hypothyroidism  OA (osteoarthritis)  Anxiety  GERD (gastroesophageal reflux disease)  Hypercholesteremia  H/O colonoscopy 6 MO AGO  H/O abdominal hysterectomy 2008    Allergies  latex (Hives)  No Known Drug Allergies    Home Medications:  aspirin 81 mg oral tablet: 1 tab(s) orally once a day (06 Apr 2018 11:09)  Crestor 10 mg oral tablet: 1 tab(s) orally once a day (at bedtime) (06 Apr 2018 11:09)  LORazepam 0.5 mg oral tablet: 1 tab(s) orally 2 times a day, As Needed (06 Apr 2018 11:09)  metoprolol tartrate 25 mg oral tablet:  (06 Apr 2018 11:09)  Synthroid 25 mcg (0.025 mg) oral tablet: 1 tab(s) orally once a day (06 Apr 2018 11:09)  Vitamin D3 1000 intl units oral tablet: 1 tab(s) orally once a day (06 Apr 2018 11:09)      Patient is intubated  Eye Exam   Unable to check vision.   bilateral upper eye lid hematomas     OS Conjunctival Hemorrhage, OS Nasal much less    IOP OD 8 OS 15  via Icare  PERRL NO APD  Pupils are sluggish in movement. Miotic  Globe is intact.   No NAFL staining

## 2021-02-25 NOTE — CONSULT NOTE ADULT - ATTENDING COMMENTS
Patient seen at bedside  Unable to participate in exam as sedated  Met with patient's daughter, Jelena, and patient's son-in-law outside patient's room and SICU team  Palliative care introduced  Patient's poor prognosis and clinical condition discussed  Patient's living will also discussed  GOC and comfort measures only discussed  Family agreed to DNR and no escalation of care  Plan to discuss comfort measures only and palliative extubation tonight  Palliative care to reach back out tomorrow regarding GOC    30 minutes spent on advance care planning

## 2021-02-26 NOTE — PROGRESS NOTE ADULT - SUBJECTIVE AND OBJECTIVE BOX
LAKESHIA SANCHEZ  [id number]  81y Female    Indication for ICU admission: q1h neurochecks & respiratory monitoring, s/p witnessed fall, +HT, +LOC, +AC (ASA)    Admit Date:02-24-21  ICU Date: 2/24/21  OR Date: N/A    latex (Hives)  No Known Drug Allergies    PAST MEDICAL & SURGICAL HISTORY:  Hypothyroidism  OA (osteoarthritis)  Anxiety  GERD (gastroesophageal reflux disease)  Hypercholesteremia  H/O colonoscopy 6 MO AGO  H/O abdominal hysterectomy 2008      Home Medications:  aspirin 81 mg oral tablet: 1 tab(s) orally once a day (06 Apr 2018 11:09)  Crestor 10 mg oral tablet: 1 tab(s) orally once a day (at bedtime) (06 Apr 2018 11:09)  LORazepam 0.5 mg oral tablet: 1 tab(s) orally 2 times a day, As Needed (06 Apr 2018 11:09)  metoprolol tartrate 25 mg oral tablet:  (06 Apr 2018 11:09)  Synthroid 25 mcg (0.025 mg) oral tablet: 1 tab(s) orally once a day (06 Apr 2018 11:09)  Vitamin D3 1000 intl units oral tablet: 1 tab(s) orally once a day (06 Apr 2018 11:09)        24HRS EVENT:  Night  - no acute events ov    DAY  -500 NS bolus in am  -Levo @ 0.01  -11:00 trops trending down 0.39  -on minimal vent settings  -meeting with family and palliative, NO escalation of care, no TF, DNR, plan for possible terminal wean in AM  -added Fentanyl prn for comfort, continue precedex gtt      DVT PTX: holding as per nxs    GI PTX:pantoprazole  Injectable 40 milliGRAM(s) IV Push daily      ***Tubes/Lines/Drains  ***  Peripheral IV  Arterial Line  Urinary Catheter		Indication: Strict I&O    Date Placed:   ETT  OGT    REVIEW OF SYSTEMS    [ ] A ten-point review of systems was otherwise negative except as noted.  [x ] Due to altered mental status/intubation, subjective information were not able to be obtained from the patient. History was obtained, to the extent possible, from review of the chart and collateral sources of information.         LAKESHIA SANCHEZ  [id number]  81y Female    Indication for ICU admission: q1h neurochecks & respiratory monitoring, s/p witnessed fall, +HT, +LOC, +AC (ASA)    Admit Date:02-24-21  ICU Date: 2/24/21  OR Date: N/A    latex (Hives)  No Known Drug Allergies    PAST MEDICAL & SURGICAL HISTORY:  Hypothyroidism  OA (osteoarthritis)  Anxiety  GERD (gastroesophageal reflux disease)  Hypercholesteremia  H/O colonoscopy 6 MO AGO  H/O abdominal hysterectomy 2008      Home Medications:  aspirin 81 mg oral tablet: 1 tab(s) orally once a day (06 Apr 2018 11:09)  Crestor 10 mg oral tablet: 1 tab(s) orally once a day (at bedtime) (06 Apr 2018 11:09)  LORazepam 0.5 mg oral tablet: 1 tab(s) orally 2 times a day, As Needed (06 Apr 2018 11:09)  metoprolol tartrate 25 mg oral tablet:  (06 Apr 2018 11:09)  Synthroid 25 mcg (0.025 mg) oral tablet: 1 tab(s) orally once a day (06 Apr 2018 11:09)  Vitamin D3 1000 intl units oral tablet: 1 tab(s) orally once a day (06 Apr 2018 11:09)        24HRS EVENT:  Night  - no acute events ov    DAY  -500 NS bolus in am  -Levo @ 0.01  -11:00 trops trending down 0.39  -on minimal vent settings  -meeting with family and palliative, NO escalation of care, no TF, DNR, plan for possible terminal wean in AM  -added Fentanyl prn for comfort, continue precedex gtt      DVT PTX: holding as per nxs    GI PTX:pantoprazole  Injectable 40 milliGRAM(s) IV Push daily      ***Tubes/Lines/Drains  ***  Peripheral IV  Arterial Line  Urinary Catheter		Indication: Strict I&O    Date Placed:   ETT  OGT    REVIEW OF SYSTEMS    [ ] A ten-point review of systems was otherwise negative except as noted.  [x ] Due to altered mental status/intubation, subjective information were not able to be obtained from the patient. History was obtained, to the extent possible, from review of the chart and collateral sources of information.        CURRENT MEDS:  Neurologic Medications  dexMEDEtomidine Infusion 0.1 MICROgram(s)/kG/Hr IV Continuous <Continuous>  fentaNYL    Injectable 25 MICROGram(s) IV Push every 3 hours PRN Severe Pain (7 - 10)  levETIRAcetam  IVPB 500 milliGRAM(s) IV Intermittent every 12 hours    Respiratory Medications    Cardiovascular Medications  norepinephrine Infusion 0.01 MICROgram(s)/kG/Min IV Continuous <Continuous>    Gastrointestinal Medications  pantoprazole  Injectable 40 milliGRAM(s) IV Push daily  senna 2 Tablet(s) Oral at bedtime  sodium chloride 0.9%. 1000 milliLiter(s) IV Continuous <Continuous>    Genitourinary Medications    Hematologic/Oncologic Medications    Antimicrobial/Immunologic Medications    Endocrine/Metabolic Medications  dextrose 50% Injectable 25 Gram(s) IV Push once  insulin regular  human corrective regimen sliding scale   IV Push every 4 hours  levothyroxine Injectable 12.5 MICROGram(s) IV Push at bedtime    Topical/Other Medications  artificial tears (preservative free) Ophthalmic Solution 1 Drop(s) Both EYES every 6 hours      ICU Vital Signs Last 24 Hrs  T(C): 36.7 (25 Feb 2021 13:00), Max: 36.7 (25 Feb 2021 13:00)  T(F): 98 (25 Feb 2021 13:00), Max: 98 (25 Feb 2021 13:00)  HR: 50 (26 Feb 2021 08:00) (50 - 87)  BP: --  BP(mean): --  ABP: 125/50 (26 Feb 2021 07:39) (105/41 - 151/52)  ABP(mean): 79 (26 Feb 2021 07:39) (68 - 84)  RR: 16 (26 Feb 2021 07:39) (16 - 16)  SpO2: 100% (26 Feb 2021 08:00) (100% - 100%)      Mode: AC/ CMV (Assist Control/ Continuous Mandatory Ventilation)  RR (machine): 16  TV (machine): 380  FiO2: 40  PEEP: 5  ITime: 1  MAP: 10  PIP: 19    ABG - ( 25 Feb 2021 10:48 )  pH, Arterial: 7.42  pH, Blood: x     /  pCO2: 22    /  pO2: 230   / HCO3: 14    / Base Excess: -8.8  /  SaO2: 100             LABS:    CAPILLARY BLOOD GLUCOSE      POCT Blood Glucose.: 113 mg/dL (25 Feb 2021 21:52)  POCT Blood Glucose.: 119 mg/dL (25 Feb 2021 17:16)                          10.0   9.31  )-----------( 106      ( 26 Feb 2021 00:50 )             30.8         02-26    140  |  109  |  17  ----------------------------<  127<H>  4.1   |  17  |  0.7      Calcium, Total Serum: 8.5 mg/dL (02-26-21 @ 04:00)     Blood Gas Arterial, Lactate: 0.6 mmoL/L (02-25-21 @ 10:48)  Blood Gas Arterial, Lactate: 2.6 mmoL/L (02-24-21 @ 19:41)  Blood Gas Venous - Lactate: 2.5 mmoL/L (02-24-21 @ 08:22)  Lactate, Blood: 2.6 mmol/L (02-24-21 @ 08:03)    ABG - ( 25 Feb 2021 10:48 )  pH: 7.42  /  pCO2: 22    /  pO2: 230   / HCO3: 14    / Base Excess: -8.8  /  SaO2: 100        ABG - ( 24 Feb 2021 19:41 )  pH: 7.44  /  pCO2: 33    /  pO2: 471   / HCO3: 23    / Base Excess: -0.9  /  SaO2: 100         Coags:     16.00  ----< 1.39    ( 26 Feb 2021 00:50 )     27.9        CARDIAC MARKERS ( 25 Feb 2021 11:48 )  x     / 0.39 ng/mL / x     / x     / x      CARDIAC MARKERS ( 25 Feb 2021 06:06 )  x     / 0.71 ng/mL / x     / x     / x      CARDIAC MARKERS ( 25 Feb 2021 00:37 )  x     / 0.88 ng/mL / x     / x     / x      CARDIAC MARKERS ( 24 Feb 2021 16:39 )  x     / 1.35 ng/mL / x     / x     / x            PHYSICAL EXAM:    General/Neuro   GCS: 6T    =  E  1 / V  1 / M    4  Deficits:  + gag reflex, withdraws all extremities to pain except RUE, does not open eyes, or follows commands  Pupils: sluggishly reactive b/l, no corneals  Lungs:  clear to auscultation, Normal expansion/effort.   Cardiovascular : S1, S2.  ralph    GI: Abdomen soft, Non-tender, Non-distended.  Nasogastric tube in place.    Extremities: Extremities warm, well-perfused.  No Peripheral edema b/l LE  Derm: Good skin turgor, no skin breakdown.    : Griggs catheter in place.

## 2021-02-26 NOTE — PROGRESS NOTE ADULT - ASSESSMENT
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 L retrobulbar hemorrhage vs globe rupture     NEURO:   Sedation - weaned off Propofol, started on low-dose Precedex  -maintain RASS 0 to -1  Pain - controlled with Fent prn  Nsx c/s: q1h neurochecks, load Keppra 1g, continue with 500BID, , 1 platelet for ASA reversal, no mannitol  -rpt CTH: significant enlargement in IPHs with extension in L lateral ventricle, increased mass effect (1cm shift to the R), impending L uncal herniation; increased diffuse SAHs; enlarged SDH now extending along the lateral L middle cranial fossa  -CTA: no large vessel occlusion/stenosis/vascular malformation; + 0.4 x 0.3cm saccular aneurysms along the medial aspect of the b/l ICAs remote from site of large IPH  Optho following - globe intact, no rupture, no concern for hemorrhage - started on artificial tears ointment  OMFS c/s --> no acute surgical intervention  Hx of anxiety - holding home Lorazepam while sedated  ****meeting with family and palliative, NO escalation of care, no TF, DNR, plan for possible terminal wean in AM  -added Fentanyl prn for comfort, continue precedex gtt    RESP:   Intubated for airway protection   Vent: 380/16/40/5  AM AB.42/22/230/%  AM CXR    CARDS:   Acute HTN - on Cleviprex gtt weaned off  Levo started 6 am, now @ 0.01, NO escalation of care  -maintain SBP < 160, MAP > 65  Hx of HLD - on home statin  trop neg x1, 1.35 --> 0.88 --> 0.71 --> 0.39  NM Stress test (2020): EF > 55%, negative stress test  EKG: NSR, QTc 486    GI/NUTR:   NPO  OGT to LCS  PPI  Senna    /RENAL:   Griggs - strict I&O  NS @ 100cc/hr  BUN/Cr 12/0.8> 12/.7  Lactic acidosis - lactate 2.6 ->0.6  UA neg (+glucose, +ketones)  Na 142// K+ 3.0 // Mg 1.4 // Phos 2.9  @ 0005  -lytes repleted     HEME/ONC:   Hgb: Stable 10.3   DVT ppx: holding as per neurosurgery  holding home ASA  -reversed w/ 1u platelets as per neurosurgery request    ID:   WBC 9.31  Tmax 101 ( 0744)  No abx at this time  COVID neg  UA neg    ENDO:  Hx of hypothyroidism - on home Synthroid  FS q4h while NPO - ISS if needed, A1C pending    LINES/DRAINS: ETT, OGT, R Anson Rider, PIV    DISPO: SICU     Assessment & Plan    82y/o Female s/p witnessed fall (slip on ice), intubated 2/2 decompensated mental status, wiith acute findings of:  -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 L retrobulbar hemorrhage vs globe rupture     NEURO:   Sedation - weaned off Propofol, started on low-dose Precedex  -maintain RASS 0 to -1  Pain - controlled with Fent prn  Nsx c/s: q1h neurochecks, load Keppra 1g, continue with 500BID, , 1 platelet for ASA reversal, no mannitol  -rpt CTH: significant enlargement in IPHs with extension in L lateral ventricle, increased mass effect (1cm shift to the R), impending L uncal herniation; increased diffuse SAHs; enlarged SDH now extending along the lateral L middle cranial fossa  -CTA: no large vessel occlusion/stenosis/vascular malformation; + 0.4 x 0.3cm saccular aneurysms along the medial aspect of the b/l ICAs remote from site of large IPH  Optho following - globe intact, no rupture, no concern for hemorrhage - started on artificial tears ointment  OMFS c/s --> no acute surgical intervention  Hx of anxiety - holding home Lorazepam while sedated  ****meeting with family and palliative, NO escalation of care, no TF, DNR, plan for possible terminal wean this AM  -added Fentanyl prn for comfort, continue precedex gtt    RESP:   Intubated for airway protection   Vent: 380/16/40/5  AM AB.42/22/230/%  AM CXR    CARDS:   Acute HTN - on Cleviprex gtt weaned off  Levo started 6 am, now @ 0.01, NO escalation of care  -maintain SBP < 160, MAP > 65  Hx of HLD - on home statin  trop neg x1, 1.35 --> 0.88 --> 0.71 --> 0.39  NM Stress test (2020): EF > 55%, negative stress test  EKG: NSR, QTc 486    GI/NUTR:   NPO  OGT to LCS  PPI  Senna    /RENAL:   Griggs - strict I&O  NS @ 100cc/hr  BUN/Cr 12/0.8> 12/.7  Lactic acidosis - lactate 2.6 ->0.6  UA neg (+glucose, +ketones)  Na 142// K+ 3.0 // Mg 1.4 // Phos 2.9  @ 0005  -lytes repleted     HEME/ONC:   Hgb: Stable 10.3   DVT ppx: holding as per neurosurgery  holding home ASA  -reversed w/ 1u platelets as per neurosurgery request    ID:   WBC 9.31  Tmax 101 ( 0744)  No abx at this time  COVID neg  UA  neg    ENDO:  Hx of hypothyroidism - on home Synthroid  FS q4h while NPO - ISS if needed, A1C pending    LINES/DRAINS: ETT, OGT, R Anson Rider, PIV    DISPO: SICU

## 2021-02-26 NOTE — PROGRESS NOTE ADULT - ASSESSMENT
81yFemale being evaluated for goals of care and symptom management. Pt is in ER on vent/pressors and NS. Family daughter Jelena requesting palliative vent withdrawal in accordnace with her mom's living will. Daughter w her  for support. They spoke to SICU team this am verbalized clear understanding of grave prognosis. process of vent withdrawal and comfort measures reviewed in detail. All questions answered.    Palliative MD and NP assessed pt. Will continue Propofol gtt as her had increased involuntary movements overnight and Precedex was changed to propofol. Pt will benefit from continued propofol for management of symptoms. Pt will have PRN Ativan and Morphine available for symptom management r/t vent withdrawal. Only will continue keppra IV as well. Palliative care NP reviewed medications with ER nurse. Support provided to family           Recommendations:  DNR/DNI/CMO  Propofol gtt  Ativan 2mg IV q 15minutes PRN  Morphine 4mg IV Q 15 min PRN  Glycopyrrolate x 1  Monitor for symptoms and treat as needed Case Summary  81yFemale being evaluated for goals of care and symptom management. Pt is in ER on vent/pressors and NS. Family daughter Jelena requesting palliative vent withdrawal in accordance with her mom's living will. Daughter w her  for support. They spoke to SICU team this am verbalized clear understanding of grave prognosis. Process of vent withdrawal and comfort measures reviewed in detail. All questions answered.    Palliative MD and NP assessed pt. Will continue Propofol gtt as her had increased involuntary movements overnight and Precedex was changed to propofol. Pt will benefit from continued propofol for management of symptoms. Pt will have PRN Ativan and Morphine available for symptom management r/t vent withdrawal. Only will continue keppra IV as well. Palliative care NP reviewed medications with ER nurse. Support provided to family       Recommendations:  DNR/DNI/CMO  No additional IVF, artificial nutrition, antibiotics, escalation of oxygen, escalation of care  New MOLST filled out and placed in chart  Continue Propofol gtt  Ativan 2mg IV q 15minutes PRN for agitation/anxiety  Morphine 4mg IV Q 15 min PRN for dyspnea/pain  Glycopyrrolate x 1 for secretions  Monitor for symptoms and treat as needed    20 minutes spent on advance care planning

## 2021-02-26 NOTE — PROGRESS NOTE ADULT - SUBJECTIVE AND OBJECTIVE BOX
81yFemale with diagnosis: INTRACRANIAL BLED ORBITAL WALLFX GLOBE PTOSIS        Patient seen for follow up, family at bedside requesting vent withdrawal.       PHYSICAL EXAM    Constitutional: pt appears as stated age, critically ill, not participate in exam  Respiratory: no distress on vent, orally intubated  Cardiovascular: no edema   Gastrointestinal: soft NT   Genitourinary: sebastian to straight drainage   Extremities: (+) non purposeful movement   Neurological: withdraws to pain otherwise unresponsive   Skin: noted with purpura B/L orbits   Psych: unable to determine as sedated    T(C): , Max: 36.7 (13:00)  T(F): 98  HR: 50 (50 - 64)  BP: --  RR: 16 (16 - 16)  SpO2: 100% (100% - 100%)              LABS:                          10.0   9.31  )-----------( 106      ( 26 Feb 2021 00:50 )             30.8                                                                                      02-26    140  |  109  |  17  ----------------------------<  127<H>  4.1   |  17  |  0.7    Ca    8.5      26 Feb 2021 04:00  Phos  2.9     02-26  Mg     1.4     02-26                                                        MEDICATIONS  (STANDING):  artificial tears (preservative free) Ophthalmic Solution 1 Drop(s) Both EYES every 6 hours  glycopyrrolate Injectable 0.4 milliGRAM(s) IV Push once  levETIRAcetam  IVPB 500 milliGRAM(s) IV Intermittent every 12 hours  propofol Infusion 3 MICROgram(s)/kG/Min (0.9 mL/Hr) IV Continuous <Continuous>    MEDICATIONS  (PRN):  LORazepam   Injectable 2 milliGRAM(s) IV Push every 15 minutes PRN restlessness  morphine  - Injectable 4 milliGRAM(s) IV Push every 15 minutes PRN respiratory distress           81yFemale with diagnosis: INTRACRANIAL BLED ORBITAL WALLFX GLOBE PTOSIS    Patient seen for follow up, family at bedside requesting vent withdrawal.       PHYSICAL EXAM    Constitutional: pt appears as stated age, critically ill, not participating in exam  Respiratory: no distress on vent, orally intubated, RR:16  Cardiovascular: no edema, bradycardia  Gastrointestinal: soft NT   Genitourinary: sebastian to straight drainage   Extremities: (+) non purposeful movement, does not participate in exam  Neurological: withdraws to pain otherwise unresponsive   Skin: noted with purpura B/L orbits   Psych: unable to determine as sedated    T(C): , Max: 36.7 (13:00)  T(F): 98  HR: 50 (50 - 64)  BP: --  RR: 16 (16 - 16)  SpO2: 100% (100% - 100%)              LABS:                          10.0   9.31  )-----------( 106      ( 26 Feb 2021 00:50 )             30.8                                                                                      02-26    140  |  109  |  17  ----------------------------<  127<H>  4.1   |  17  |  0.7    Ca    8.5      26 Feb 2021 04:00  Phos  2.9     02-26  Mg     1.4     02-26                                                        MEDICATIONS  (STANDING):  artificial tears (preservative free) Ophthalmic Solution 1 Drop(s) Both EYES every 6 hours  glycopyrrolate Injectable 0.4 milliGRAM(s) IV Push once  levETIRAcetam  IVPB 500 milliGRAM(s) IV Intermittent every 12 hours  propofol Infusion 3 MICROgram(s)/kG/Min (0.9 mL/Hr) IV Continuous <Continuous>    MEDICATIONS  (PRN):  LORazepam   Injectable 2 milliGRAM(s) IV Push every 15 minutes PRN restlessness  morphine  - Injectable 4 milliGRAM(s) IV Push every 15 minutes PRN respiratory distress

## 2021-02-27 NOTE — PROGRESS NOTE ADULT - ASSESSMENT
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 L retrobulbar hemorrhage vs globe rupture     NEURO:   Sedation - weaned off Propofol, started on low-dose Precedex  -maintain RASS 0 to -1  Pain - controlled with Fent prn  Nsx c/s: q1h neurochecks, load Keppra 1g, continue with 500BID, , 1 platelet for ASA reversal, no mannitol  -rpt CTH: significant enlargement in IPHs with extension in L lateral ventricle, increased mass effect (1cm shift to the R), impending L uncal herniation; increased diffuse SAHs; enlarged SDH now extending along the lateral L middle cranial fossa  -CTA: no large vessel occlusion/stenosis/vascular malformation; + 0.4 x 0.3cm saccular aneurysms along the medial aspect of the b/l ICAs remote from site of large IPH  Optho following - globe intact, no rupture, no concern for hemorrhage - started on artificial tears ointment  OMFS c/s --> no acute surgical intervention  Hx of anxiety - holding home Lorazepam while sedated  ****meeting with family and palliative, NO escalation of care, no TF, DNR, plan for possible terminal wean in AM  -added Fentanyl prn for comfort, continue precedex gtt    RESP:   Intubated for airway protection   Vent: 380/16/40/5  AM AB.42/22/230/%  AM CXR    CARDS:   Acute HTN - on Cleviprex gtt weaned off  Levo started 6 am, now @ 0.01, NO escalation of care  -maintain SBP < 160, MAP > 65  Hx of HLD - on home statin  trop neg x1, 1.35 --> 0.88 --> 0.71 --> 0.39  NM Stress test (2020): EF > 55%, negative stress test  EKG: NSR, QTc 486    GI/NUTR:   NPO  OGT to LCS  PPI  Senna    /RENAL:   Griggs - strict I&O  NS @ 100cc/hr  BUN/Cr 12/0.8> 12/0.7>13/0.5  Lactic acidosis - lactate 2.6 ->0.6  UA neg (+glucose, +ketones)  Na 140// K+ 4.1 // Mg -- // Phos --  @ 0400    HEME/ONC:   Hgb: Stable 10.3   DVT ppx: holding as per neurosurgery  holding home ASA  -reversed w/ 1u platelets as per neurosurgery request    ID:   WBC 9.31  Tmax 101 ( 0744)  No abx at this time  COVID neg  UA neg    ENDO:  Hx of hypothyroidism - on home Synthroid  FS q4h while NPO - ISS if needed, A1C pending    LINES/DRAINS: ETT, OGT, R Anson Rider, PIV    DISPO: SICU

## 2021-02-27 NOTE — PROGRESS NOTE ADULT - ATTENDING COMMENTS
Family decided for terminal wean.  Patient was extubated yesterday.  Placed on Morphine drip and Propofol.    Discussed with Palliative care - will stop Propofol.  Continue Morphine at 4mg/h.  Ativan 2 mh q4h  All was discussed with patient's family as well.
Patient remains in coma.  Not a surgical candidate as per Neurosurgery due to the extensity of the ICHs.  Hypotensive this am and was placed on Levo drip.  On Precedex drip.  Responds to painful stimuli.    ASSESSMENT:  80 y/o female, S/P Fall.  Traumatic Bilateral IPH of the brain.  Traumatic Bilateral SAHs  Traumatic Left SDH.  Bilateral Orbital Wall Fractures  Intubated for airway protection.  On [employer] Ventilation.  Elevated Troponin  Hypotension.    PLAN:  - Neuro: continue neuro checks; on Keppra.  - Respiratory: continue Vent support; follow ABG  - Cardio: keep MAP>65; give iv bolus  ml; on Levo  - GI: ok to start tube feeds and advance as tolerated  - Renal: monitor serum electrolytes and UOP  - ID: universal precautions  - GI and DVT prophylaxis with SCDs only for now.  - Palliative care evaluation.    Overall poor prognosis.  Will discuss with family the goals of care.
NP note noted above  Patient seen at bedside  She was switched to propofol overnight as patient was having episodes of myoclonus and ?agitation  Continued propofol on extubation for comfort as above  Patient required multiple PRNs and morphine drip was started for comfort after extubation    Recommendations  -continue morphine infusion at 4mg/hour  -continue propofol infusion at current rate  -continue ativan 2mg IV q 15minutes PRN for agitation/anxiety  -continue morphine 4mg IV Q 15 min PRN for dyspnea/pain  -if patient has increased secretions, start glycopyrrolate at 0.2mg IV q6 ATC  -if patient needs to be moved out of the ED and cannot continue propofol infusion because it can't be continued on the floor, recommend discontinuing propofol infusion and starting ativan infusion at 1mg/hour and calling [phone] for additional recommendations    Please call [phone] 24/7 PRN.
Patient remains in coma.  Brain stem reflexes diminished.  Off Levo this am.  Family in process of deciding for terminal wean.    ASSESSMENT:  82 y/o female, S/P Fall.  Traumatic Bilateral IPH of the brain.  Traumatic Bilateral SAHs  Traumatic Left SDH.  Bilateral Orbital Wall Fractures  Intubated for airway protection.  On [employer] Ventilation.  Elevated Troponin  Hypotension.    PLAN:  - neuro checks; on Keppra - no corneal reflex, other present but diminished  - continue Vent support; PEEP 5; FiO2 40%  - Cardio: keep MAP>65  - NPO  - monitor serum electrolytes and UOP  - ID: universal precautions  - GI and DVT prophylaxis with SCDs only for now.  - Palliative care f/u for goals of care    As per Neurosurgery , it is a catastrophic TBI with very grim prognosis.  Palliative care on board and will discuss with family further goals of care.  I met with patient's daughter and son-in-law and updated the regarding all the above

## 2021-02-27 NOTE — PROGRESS NOTE ADULT - SUBJECTIVE AND OBJECTIVE BOX
LAKESHIA SANCHEZ  [id number]  81y Female    Indication for ICU admission: q1h neurochecks & respiratory monitoring, s/p witnessed fall, +HT, +LOC, +AC (ASA)    Admit Date:02-24-21  ICU Date: 2/24/21  OR Date: N/A    latex (Hives)  No Known Drug Allergies    PAST MEDICAL & SURGICAL HISTORY:  Hypothyroidism  OA (osteoarthritis)  Anxiety  GERD (gastroesophageal reflux disease)  Hypercholesteremia  H/O colonoscopy 6 MO AGO  H/O abdominal hysterectomy 2008      Home Medications:  aspirin 81 mg oral tablet: 1 tab(s) orally once a day (06 Apr 2018 11:09)  Crestor 10 mg oral tablet: 1 tab(s) orally once a day (at bedtime) (06 Apr 2018 11:09)  LORazepam 0.5 mg oral tablet: 1 tab(s) orally 2 times a day, As Needed (06 Apr 2018 11:09)  metoprolol tartrate 25 mg oral tablet:  (06 Apr 2018 11:09)  Synthroid 25 mcg (0.025 mg) oral tablet: 1 tab(s) orally once a day (06 Apr 2018 11:09)  Vitamin D3 1000 intl units oral tablet: 1 tab(s) orally once a day (06 Apr 2018 11:09)        24HRS EVENT:  Night  -no acute events overnight    Day  -CMO/DNR/DNI  -terminal wean @ 12:50pm, on Propofol gtt with Morphine gtt, and Ativan prn       DVT PTX: holding as per nxs    GI PTX:pantoprazole  Injectable 40 milliGRAM(s) IV Push daily      ***Tubes/Lines/Drains  ***  Peripheral IV  Arterial Line  Urinary Catheter		Indication: Strict I&O    Date Placed:   ETT  OGT    REVIEW OF SYSTEMS    [ ] A ten-point review of systems was otherwise negative except as noted.  [x ] Due to altered mental status/intubation, subjective information were not able to be obtained from the patient. History was obtained, to the extent possible, from review of the chart and collateral sources of information.

## 2021-02-27 NOTE — CHART NOTE - NSCHARTNOTEFT_GEN_A_CORE
Palliative Care chart note- patient not seen    81 year old woman with significant intracranial hemorrhage following a fall, now palliatively extubated and comfort measures only.  Palliative care has been following.    Palliative care contacted by primary SICU team today.  Patient has been in ED since initiation of comfort measures and palliative extubation.  At time of extubation, patient's propofol was continued for comfort in the setting of likely myoclonus and a morphine infusion was eventually started as patient was symptomatic.    Patient has not received PRNs since yesterday afternoon after initiation of the morphine infusion.  Today, primary SICU team and they wish to transfer patient to the floor.  As such, they wish to discontinue propofol in favor of another form of sedation/symptom management. It appears an ativan infusion will also not be possible on the floor.  Per primary surgical team, patient is not significantly symptomatic at this time.    Recommendations  If patient is going to be transferred to the medical floor from the ED and propofol or an ativan infusion will not be possible, recommendations are as follows:  -DNR/DNI, continue comfort measures only  -No additional IVF, artificial nutrition, blood draws, anitbiotics, escalation of oxygen, escalation of care  -continue morphine infusion at 4mg/hour  -change frequency of PRN morphine to 4mg IV q1h PRN for dyspnea/pain  -if/when propofol is stopped by primary team, start ativan 2mg IV q4h ATC  -change frequency of PRN ativan to 2mg IV q1h PRN for agitation/anxiety/myoclonus  -continue glycopyrrolate  -continue keppra  -palliative care will continue to follow    [phone]
SICU PA Transfer Note:    82 yo female with PMHx of anxiety on ativan, on ASA, s/p witnessed fall on ice on 2/24 am.  Pt was seen walking on the ice and fell backwards hitting the back of her head. She does not remember the fall but presented initially with GCS of 15 and bilateral upper eyelid hematomas. Pt without specific pain or radiation; pt states + nausea. Initial CT revealed bilateral frontal intraparenchymal hemorrhages, as well as right parietal hemorrhage. Also noted multiple acute subarachnoid hemorrhages, and nondisplaced fx's of b/l superior & L lateral orbital walls w/ fluid lining; mild L-sided proptosis and questionable L retrobulbar hemorrhage vs globe rupture. 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     Then pt had mental status change, GCS 4, intubated. Repeat CT head showed large bifrontal & parietal lobe IPH (L > R) w/ mass effect, small scattered SAHs along bifrontal sulci, anterior interhemispheric fissure, R frontoparietal sulci, and interpeduncular cistern, small SDH along frontal convexity & along the falx and nondisplaced R parietal calvarial fracture extending into the R coronal suture w/ overlying R parietal scalp hematoma. Pt was hemodynamically unstable and stared on levophed. Due to age and catastrophic size of hemorrhage patient was not a surgical candidate. Palliative medicine consulted, family meeting held on 2/25, and pt was made DNR and planned for terminal extubation. Family was bedside.     Pt was terminally extubated @ 12:50pm today by palliative care, made Comfort measures only, started on Morphine gtt @ 4mg/hr, with Ativan prn.  Pt is now transfer to the floor for palliative comfort measures with family by the bedside.      Live ON was called earlier today @ about 12 noon, pt is not a candidate for any organ donation or tissue donation due to age,   Ref # - [id number]    Sign-out given to Dr. Murrell on 2/26/21 @ 5:35pm from Trauma team.
SICU PA Note:    Held the meeting with palliative care team and pt's daughter with her , who understands mother's grave neurologic prognosis, no neurosurgical intervention, and mother's wishes in the living will, that she wouldn't want this type of quality of life.  The decision was made NO escalation of care for tonight, no tube feeds, and plan for terminal wean in the morning.  Verbal DNR was signed earlier in a day.  Dr. Wisdom aware of the current plan.

## 2021-02-27 NOTE — ED ADULT NURSE REASSESSMENT NOTE - NS ED NURSE REASSESS COMMENT FT1
At 1250 pm palative care at bed side with respiratory and primary RN pt extubated at this time all monitors and medication drips stopped , family at bedside with patient . RN continue to watch patient until passing
Plan of care discussed with Family Palliative and SICU team no escalation of care will take place. Patient will remain on LEVO .01mcg/kg/ hr, and presidex for sedation tomorrow for a Terminal extubation. Family educated and understanding was verbalized.
patient off all sedation. patient has a gag reflex, is moving right side of body, no movement to left arm, flexion to pain on left leg. Patient able to squeeze right hand when being asked. Patient right pupil 4mm reactive to light, unable to assess left eye due to swelling. HOB elevated 45* angle.
pt assessed. family at bedside. family refusing all care. no vitals. pt in no distress at this time.
patient intubated at 1020am by RIANA quevedo and dr. dhaliwal at bedside. Patient GCS changed and has left sided deficits. Patient on arrival was able to speak in full sentences, and move all 4 extremities. Patient now unable to move left side and only  able to more right toes unable to lift up right leg. Patient able to squeeze right hand. Patient requiring sternal rub to answer questions. Patient tube size 7.5 lip line 24, confirmed with xray. Patient placed on nicardipine drip to lower BP goal systolic <160. Patient hob elevated at 45* angle. Safety precautions maintained.

## 2021-03-04 NOTE — DISCHARGE NOTE FOR THE EXPIRED PATIENT - HOSPITAL COURSE
80 yo female with PMHx of anxiety on ativan, on ASA, s/p witnessed fall on ice on 2/24 am.  Pt was seen walking on the ice and fell backwards hitting the back of her head. She does not remember the fall but presented initially with GCS of 15 and bilateral upper eyelid hematomas. Pt without specific pain or radiation; pt states + nausea. Initial CT revealed bilateral frontal intraparenchymal hemorrhages, as well as right parietal hemorrhage. Also noted multiple acute subarachnoid hemorrhages, and nondisplaced fx's of b/l superior & L lateral orbital walls w/ fluid lining; mild L-sided proptosis and questionable L retrobulbar hemorrhage vs globe rupture. 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     Then pt had mental status change, GCS 4, intubated. Repeat CT head showed large bifrontal & parietal lobe IPH (L > R) w/ mass effect, small scattered SAHs along bifrontal sulci, anterior interhemispheric fissure, R frontoparietal sulci, and interpeduncular cistern, small SDH along frontal convexity & along the falx and nondisplaced R parietal calvarial fracture extending into the R coronal suture w/ overlying R parietal scalp hematoma. Pt was hemodynamically unstable and stared on levophed. Due to age and catastrophic size of hemorrhage patient was not a surgical candidate. Palliative medicine consulted, family meeting held and pt was made DNR and planned for terminal extubation. Family was bedside during time of death on 2/27/2021 at 19:05

## 2021-03-11 DIAGNOSIS — R40.2212 COMA SCALE, BEST VERBAL RESPONSE, NONE, AT ARRIVAL TO EMERGENCY DEPARTMENT: ICD-10-CM

## 2021-03-11 DIAGNOSIS — S06.6X9A TRAUMATIC SUBARACHNOID HEMORRHAGE WITH LOSS OF CONSCIOUSNESS OF UNSPECIFIED DURATION, INITIAL ENCOUNTER: ICD-10-CM

## 2021-03-11 DIAGNOSIS — S02.842A FRACTURE OF LATERAL ORBITAL WALL, LEFT SIDE, INITIAL ENCOUNTER FOR CLOSED FRACTURE: ICD-10-CM

## 2021-03-11 DIAGNOSIS — S00.11XA CONTUSION OF RIGHT EYELID AND PERIOCULAR AREA, INITIAL ENCOUNTER: ICD-10-CM
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
## 2021-03-11 DIAGNOSIS — E87.2 ACIDOSIS: ICD-10-CM

## 2021-03-11 DIAGNOSIS — R40.2332: ICD-10-CM

## 2021-03-11 DIAGNOSIS — M19.90 UNSPECIFIED OSTEOARTHRITIS, UNSPECIFIED SITE: ICD-10-CM

## 2021-03-11 DIAGNOSIS — Z91.040 LATEX ALLERGY STATUS: ICD-10-CM

## 2021-03-11 DIAGNOSIS — S02.0XXA FRACTURE OF VAULT OF SKULL, INITIAL ENCOUNTER FOR CLOSED FRACTURE: ICD-10-CM

## 2021-03-11 DIAGNOSIS — S06.1X9A TRAUMATIC CEREBRAL EDEMA WITH LOSS OF CONSCIOUSNESS OF UNSPECIFIED DURATION, INITIAL ENCOUNTER: ICD-10-CM

## 2021-03-11 DIAGNOSIS — S06.5X9A TRAUMATIC SUBDURAL HEMORRHAGE WITH LOSS OF CONSCIOUSNESS OF UNSPECIFIED DURATION, INITIAL ENCOUNTER: ICD-10-CM

## 2021-03-11 DIAGNOSIS — K21.9 GASTRO-ESOPHAGEAL REFLUX DISEASE WITHOUT ESOPHAGITIS: ICD-10-CM

## 2021-03-11 DIAGNOSIS — I10 ESSENTIAL (PRIMARY) HYPERTENSION: ICD-10-CM

## 2021-03-11 DIAGNOSIS — Z79.82 LONG TERM (CURRENT) USE OF ASPIRIN: ICD-10-CM

## 2021-03-11 DIAGNOSIS — E03.9 HYPOTHYROIDISM, UNSPECIFIED: ICD-10-CM

## 2021-03-11 DIAGNOSIS — Z66 DO NOT RESUSCITATE: ICD-10-CM

## 2021-03-11 DIAGNOSIS — I95.9 HYPOTENSION, UNSPECIFIED: ICD-10-CM

## 2021-03-11 DIAGNOSIS — R40.2434 GLASGOW COMA SCALE SCORE 3-8, 24 HOURS OR MORE AFTER HOSPITAL ADMISSION: ICD-10-CM

## 2021-03-11 DIAGNOSIS — Z51.5 ENCOUNTER FOR PALLIATIVE CARE: ICD-10-CM

## 2021-03-11 DIAGNOSIS — G93.5 COMPRESSION OF BRAIN: ICD-10-CM

## 2021-03-11 DIAGNOSIS — E78.5 HYPERLIPIDEMIA, UNSPECIFIED: ICD-10-CM

## 2021-03-11 DIAGNOSIS — I16.0 HYPERTENSIVE URGENCY: ICD-10-CM

## 2021-03-11 DIAGNOSIS — F41.9 ANXIETY DISORDER, UNSPECIFIED: ICD-10-CM

## 2021-03-11 DIAGNOSIS — Z90.710 ACQUIRED ABSENCE OF BOTH CERVIX AND UTERUS: ICD-10-CM

## 2021-03-11 DIAGNOSIS — S00.12XA CONTUSION OF LEFT EYELID AND PERIOCULAR AREA, INITIAL ENCOUNTER: ICD-10-CM

## 2021-03-11 DIAGNOSIS — Z78.1 PHYSICAL RESTRAINT STATUS: ICD-10-CM

## 2021-03-11 DIAGNOSIS — R40.2112 COMA SCALE, EYES OPEN, NEVER, AT ARRIVAL TO EMERGENCY DEPARTMENT: ICD-10-CM

## 2021-03-11 DIAGNOSIS — I72.9 ANEURYSM OF UNSPECIFIED SITE: ICD-10-CM

## 2021-04-08 ENCOUNTER — APPOINTMENT (OUTPATIENT)
Dept: CARDIOLOGY | Facility: CLINIC | Age: 81
End: 2021-04-08

## 2022-06-15 NOTE — ED PROVIDER NOTE - PR
